# Patient Record
Sex: FEMALE | Race: NATIVE HAWAIIAN OR OTHER PACIFIC ISLANDER | Employment: OTHER | URBAN - METROPOLITAN AREA
[De-identification: names, ages, dates, MRNs, and addresses within clinical notes are randomized per-mention and may not be internally consistent; named-entity substitution may affect disease eponyms.]

---

## 2018-09-08 ENCOUNTER — APPOINTMENT (OUTPATIENT)
Dept: CT IMAGING | Age: 83
DRG: 917 | End: 2018-09-08
Payer: MEDICARE

## 2018-09-08 ENCOUNTER — APPOINTMENT (OUTPATIENT)
Dept: GENERAL RADIOLOGY | Age: 83
DRG: 917 | End: 2018-09-08
Payer: MEDICARE

## 2018-09-08 ENCOUNTER — HOSPITAL ENCOUNTER (INPATIENT)
Age: 83
LOS: 3 days | Discharge: PSYCHIATRIC OR SUBSTANCE USE FACILITY | DRG: 917 | End: 2018-09-12
Attending: EMERGENCY MEDICINE | Admitting: INTERNAL MEDICINE
Payer: MEDICARE

## 2018-09-08 DIAGNOSIS — R41.82 ALTERED MENTAL STATUS, UNSPECIFIED ALTERED MENTAL STATUS TYPE: Primary | ICD-10-CM

## 2018-09-08 DIAGNOSIS — T50.904A DRUG OVERDOSE, UNDETERMINED INTENT, INITIAL ENCOUNTER: ICD-10-CM

## 2018-09-08 LAB
BASE EXCESS VENOUS: 7 (ref -3–3)
HCO3 VENOUS: 31.1 MMOL/L (ref 23–29)
LACTATE: 1.32 MMOL/L (ref 0.4–2)
O2 SAT, VEN: 72 %
PCO2, VEN: 48.9 MM HG (ref 40–50)
PERFORMED ON: ABNORMAL
PH VENOUS: 7.41 (ref 7.35–7.45)
PO2, VEN: 38 MM HG
POC SAMPLE TYPE: ABNORMAL
TCO2 CALC VENOUS: 33 MMOL/L

## 2018-09-08 PROCEDURE — 93005 ELECTROCARDIOGRAM TRACING: CPT | Performed by: EMERGENCY MEDICINE

## 2018-09-08 PROCEDURE — 80076 HEPATIC FUNCTION PANEL: CPT

## 2018-09-08 PROCEDURE — 82550 ASSAY OF CK (CPK): CPT

## 2018-09-08 PROCEDURE — G0480 DRUG TEST DEF 1-7 CLASSES: HCPCS

## 2018-09-08 PROCEDURE — 80048 BASIC METABOLIC PNL TOTAL CA: CPT

## 2018-09-08 PROCEDURE — 71045 X-RAY EXAM CHEST 1 VIEW: CPT

## 2018-09-08 PROCEDURE — 82803 BLOOD GASES ANY COMBINATION: CPT

## 2018-09-08 PROCEDURE — 81001 URINALYSIS AUTO W/SCOPE: CPT

## 2018-09-08 PROCEDURE — 83605 ASSAY OF LACTIC ACID: CPT

## 2018-09-08 PROCEDURE — 99291 CRITICAL CARE FIRST HOUR: CPT

## 2018-09-08 PROCEDURE — 85025 COMPLETE CBC W/AUTO DIFF WBC: CPT

## 2018-09-08 PROCEDURE — 80307 DRUG TEST PRSMV CHEM ANLYZR: CPT

## 2018-09-09 ENCOUNTER — APPOINTMENT (OUTPATIENT)
Dept: CT IMAGING | Age: 83
DRG: 917 | End: 2018-09-09
Payer: MEDICARE

## 2018-09-09 PROBLEM — R41.82 ALTERED MENTAL STATE: Status: ACTIVE | Noted: 2018-09-09

## 2018-09-09 LAB
ACETAMINOPHEN LEVEL: <5 UG/ML (ref 10–30)
ALBUMIN SERPL-MCNC: 4.6 G/DL (ref 3.4–5)
ALP BLD-CCNC: 68 U/L (ref 40–129)
ALT SERPL-CCNC: 24 U/L (ref 10–40)
AMPHETAMINE SCREEN, URINE: NORMAL
ANION GAP SERPL CALCULATED.3IONS-SCNC: 13 MMOL/L (ref 3–16)
AST SERPL-CCNC: 28 U/L (ref 15–37)
BACTERIA: ABNORMAL /HPF
BARBITURATE SCREEN URINE: NORMAL
BASOPHILS ABSOLUTE: 0.1 K/UL (ref 0–0.2)
BASOPHILS RELATIVE PERCENT: 0.3 %
BENZODIAZEPINE SCREEN, URINE: NORMAL
BILIRUB SERPL-MCNC: 0.9 MG/DL (ref 0–1)
BILIRUBIN DIRECT: <0.2 MG/DL (ref 0–0.3)
BILIRUBIN URINE: NEGATIVE
BILIRUBIN, INDIRECT: NORMAL MG/DL (ref 0–1)
BLOOD, URINE: ABNORMAL
BUN BLDV-MCNC: 13 MG/DL (ref 7–20)
CALCIUM SERPL-MCNC: 9.7 MG/DL (ref 8.3–10.6)
CANNABINOID SCREEN URINE: NORMAL
CHLORIDE BLD-SCNC: 94 MMOL/L (ref 99–110)
CLARITY: CLEAR
CO2: 27 MMOL/L (ref 21–32)
COCAINE METABOLITE SCREEN URINE: NORMAL
COLOR: YELLOW
CREAT SERPL-MCNC: <0.5 MG/DL (ref 0.6–1.2)
EOSINOPHILS ABSOLUTE: 0.1 K/UL (ref 0–0.6)
EOSINOPHILS RELATIVE PERCENT: 0.3 %
EPITHELIAL CELLS, UA: ABNORMAL /HPF
ETHANOL: NORMAL MG/DL (ref 0–0.08)
GFR AFRICAN AMERICAN: >60
GFR NON-AFRICAN AMERICAN: >60
GLUCOSE BLD-MCNC: 148 MG/DL (ref 70–99)
GLUCOSE URINE: 100 MG/DL
HCT VFR BLD CALC: 45.6 % (ref 36–48)
HEMOGLOBIN: 14.9 G/DL (ref 12–16)
KETONES, URINE: 15 MG/DL
LEUKOCYTE ESTERASE, URINE: ABNORMAL
LYMPHOCYTES ABSOLUTE: 1.6 K/UL (ref 1–5.1)
LYMPHOCYTES RELATIVE PERCENT: 9 %
Lab: NORMAL
MCH RBC QN AUTO: 30 PG (ref 26–34)
MCHC RBC AUTO-ENTMCNC: 32.6 G/DL (ref 31–36)
MCV RBC AUTO: 92 FL (ref 80–100)
METHADONE SCREEN, URINE: NORMAL
MICROSCOPIC EXAMINATION: YES
MONOCYTES ABSOLUTE: 1.1 K/UL (ref 0–1.3)
MONOCYTES RELATIVE PERCENT: 6.3 %
NEUTROPHILS ABSOLUTE: 15.1 K/UL (ref 1.7–7.7)
NEUTROPHILS RELATIVE PERCENT: 84.1 %
NITRITE, URINE: NEGATIVE
OPIATE SCREEN URINE: NORMAL
OXYCODONE URINE: NORMAL
PDW BLD-RTO: 14.3 % (ref 12.4–15.4)
PH UA: 7.5
PH UA: 7.5
PHENCYCLIDINE SCREEN URINE: NORMAL
PLATELET # BLD: 337 K/UL (ref 135–450)
PMV BLD AUTO: 8.1 FL (ref 5–10.5)
POTASSIUM SERPL-SCNC: 3.5 MMOL/L (ref 3.5–5.1)
PROPOXYPHENE SCREEN: NORMAL
PROTEIN UA: ABNORMAL MG/DL
RBC # BLD: 4.96 M/UL (ref 4–5.2)
RBC UA: ABNORMAL /HPF (ref 0–2)
SALICYLATE, SERUM: <0.3 MG/DL (ref 15–30)
SODIUM BLD-SCNC: 134 MMOL/L (ref 136–145)
SPECIFIC GRAVITY UA: 1.02
TOTAL CK: 149 U/L (ref 26–192)
TOTAL PROTEIN: 7.8 G/DL (ref 6.4–8.2)
URINE TYPE: ABNORMAL
UROBILINOGEN, URINE: 0.2 E.U./DL
WBC # BLD: 18 K/UL (ref 4–11)
WBC UA: ABNORMAL /HPF (ref 0–5)

## 2018-09-09 PROCEDURE — 6360000002 HC RX W HCPCS: Performed by: INTERNAL MEDICINE

## 2018-09-09 PROCEDURE — 1200000000 HC SEMI PRIVATE

## 2018-09-09 PROCEDURE — 2580000003 HC RX 258: Performed by: INTERNAL MEDICINE

## 2018-09-09 PROCEDURE — 72125 CT NECK SPINE W/O DYE: CPT

## 2018-09-09 PROCEDURE — 2700000000 HC OXYGEN THERAPY PER DAY

## 2018-09-09 PROCEDURE — 6370000000 HC RX 637 (ALT 250 FOR IP): Performed by: INTERNAL MEDICINE

## 2018-09-09 PROCEDURE — 70450 CT HEAD/BRAIN W/O DYE: CPT

## 2018-09-09 PROCEDURE — 94761 N-INVAS EAR/PLS OXIMETRY MLT: CPT

## 2018-09-09 RX ORDER — SODIUM CHLORIDE 0.9 % (FLUSH) 0.9 %
10 SYRINGE (ML) INJECTION EVERY 12 HOURS SCHEDULED
Status: DISCONTINUED | OUTPATIENT
Start: 2018-09-09 | End: 2018-09-12 | Stop reason: HOSPADM

## 2018-09-09 RX ORDER — POTASSIUM CHLORIDE 7.45 MG/ML
10 INJECTION INTRAVENOUS PRN
Status: DISCONTINUED | OUTPATIENT
Start: 2018-09-09 | End: 2018-09-09 | Stop reason: SDUPTHER

## 2018-09-09 RX ORDER — OMEPRAZOLE 20 MG/1
20 CAPSULE, DELAYED RELEASE ORAL DAILY
Status: ON HOLD | COMMUNITY
End: 2020-05-24

## 2018-09-09 RX ORDER — LATANOPROST 50 UG/ML
1 SOLUTION/ DROPS OPHTHALMIC NIGHTLY
Status: DISCONTINUED | OUTPATIENT
Start: 2018-09-09 | End: 2018-09-12 | Stop reason: HOSPADM

## 2018-09-09 RX ORDER — TIMOLOL MALEATE 2.5 MG/ML
1 SOLUTION OPHTHALMIC DAILY
Status: DISCONTINUED | OUTPATIENT
Start: 2018-09-09 | End: 2018-09-12 | Stop reason: HOSPADM

## 2018-09-09 RX ORDER — POTASSIUM CHLORIDE 7.45 MG/ML
10 INJECTION INTRAVENOUS PRN
Status: DISCONTINUED | OUTPATIENT
Start: 2018-09-09 | End: 2018-09-12 | Stop reason: HOSPADM

## 2018-09-09 RX ORDER — THIAMINE HYDROCHLORIDE 100 MG/ML
500 INJECTION, SOLUTION INTRAMUSCULAR; INTRAVENOUS ONCE
Status: DISCONTINUED | OUTPATIENT
Start: 2018-09-09 | End: 2018-09-09 | Stop reason: CLARIF

## 2018-09-09 RX ORDER — LORAZEPAM 0.5 MG/1
0.5 TABLET ORAL EVERY 6 HOURS PRN
Status: ON HOLD | COMMUNITY
End: 2020-05-24

## 2018-09-09 RX ORDER — SODIUM CHLORIDE 9 MG/ML
INJECTION, SOLUTION INTRAVENOUS CONTINUOUS
Status: ACTIVE | OUTPATIENT
Start: 2018-09-09 | End: 2018-09-10

## 2018-09-09 RX ORDER — THIAMINE HYDROCHLORIDE 100 MG/ML
100 INJECTION, SOLUTION INTRAMUSCULAR; INTRAVENOUS DAILY
Status: DISCONTINUED | OUTPATIENT
Start: 2018-09-10 | End: 2018-09-09 | Stop reason: CLARIF

## 2018-09-09 RX ORDER — CALCIUM CARBONATE 500(1250)
500 TABLET ORAL DAILY
Status: ON HOLD | COMMUNITY
End: 2020-05-24

## 2018-09-09 RX ORDER — LOPERAMIDE HYDROCHLORIDE 2 MG/1
2 CAPSULE ORAL 4 TIMES DAILY PRN
Status: ON HOLD | COMMUNITY
End: 2020-05-24

## 2018-09-09 RX ORDER — AMLODIPINE BESYLATE 2.5 MG/1
2.5 TABLET ORAL DAILY
Status: ON HOLD | COMMUNITY
End: 2020-05-29 | Stop reason: SDUPTHER

## 2018-09-09 RX ORDER — POTASSIUM CHLORIDE 20 MEQ/1
40 TABLET, EXTENDED RELEASE ORAL PRN
Status: DISCONTINUED | OUTPATIENT
Start: 2018-09-09 | End: 2018-09-12 | Stop reason: HOSPADM

## 2018-09-09 RX ORDER — SODIUM CHLORIDE 0.9 % (FLUSH) 0.9 %
10 SYRINGE (ML) INJECTION PRN
Status: DISCONTINUED | OUTPATIENT
Start: 2018-09-09 | End: 2018-09-12 | Stop reason: HOSPADM

## 2018-09-09 RX ORDER — LATANOPROST 50 UG/ML
1 SOLUTION/ DROPS OPHTHALMIC NIGHTLY
COMMUNITY

## 2018-09-09 RX ORDER — SACCHAROMYCES BOULARDII 250 MG
250 CAPSULE ORAL 2 TIMES DAILY
Status: ON HOLD | COMMUNITY
End: 2020-05-24

## 2018-09-09 RX ORDER — ONDANSETRON 2 MG/ML
4 INJECTION INTRAMUSCULAR; INTRAVENOUS EVERY 6 HOURS PRN
Status: DISCONTINUED | OUTPATIENT
Start: 2018-09-09 | End: 2018-09-12 | Stop reason: HOSPADM

## 2018-09-09 RX ADMIN — SODIUM CHLORIDE: 9 INJECTION, SOLUTION INTRAVENOUS at 03:52

## 2018-09-09 RX ADMIN — LATANOPROST 1 DROP: 50 SOLUTION OPHTHALMIC at 21:15

## 2018-09-09 RX ADMIN — THIAMINE HYDROCHLORIDE 500 MG: 100 INJECTION, SOLUTION INTRAMUSCULAR; INTRAVENOUS at 13:25

## 2018-09-09 RX ADMIN — ENOXAPARIN SODIUM 40 MG: 40 INJECTION SUBCUTANEOUS at 12:52

## 2018-09-09 RX ADMIN — SODIUM CHLORIDE: 9 INJECTION, SOLUTION INTRAVENOUS at 17:47

## 2018-09-09 NOTE — FLOWSHEET NOTE
09/09/18 1258   Encounter Summary   Services provided to: Patient   Referral/Consult From: Rounding   Continue Visiting (es 9/9)   Volunteer Visit (alter peerless 9/9)   Complexity of Encounter Moderate   Length of Encounter 15 minutes   Routine   Type Initial   Assessment Approachable; Sad   Intervention Active listening;Scripture;Provided reading materials/devotional materials; Discussed illness/injury and it's impact  ([facility] new year)   Outcome Receptive;Engaged in conversation

## 2018-09-09 NOTE — PROGRESS NOTES
4 Eyes Admission Assessment     I agree as the admission nurse that 2 RN's have performed a thorough Head to Toe Skin Assessment on the patient. ALL assessment sites listed below have been assessed on admission. Areas assessed by both nurses:   [x]   Head, Face, and Ears   [x]   Shoulders, Back, and Chest  [x]   Arms, Elbows, and Hands   [x]   Coccyx, Sacrum, and Ischum  [x]   Legs, Feet, and Heels        Does the Patient have Skin Breakdown?   Yes a wound was noted on the Admission Assessment and an LDA was Initiated documentation include the Clarisa-wound, Wound Assessment, Measurements, Dressing Treatment, Drainage, and Color\",         Ty Prevention initiated:  Yes   Wound Care Orders initiated:  Yes      [id number] nurse consulted for Pressure Injury (Stage 3,4, Unstageable, DTI, NWPT, and Complex wounds):  No      Nurse 1 eSignature: Electronically signed by Dominic Kaplan RN on 9/9/18 at 3:33 AM        **SHARE this note so that the co-signing nurse is able to place an eSignature**    Nurse 2 eSignature: Electronically signed by Karthik Menjivar RN on 9/9/18 at 4:19 AM

## 2018-09-09 NOTE — ED NOTES
Report to [facility] on [address], transport to floor with RN and tele      [facility]  09/09/18 6857

## 2018-09-09 NOTE — PROGRESS NOTES
Patient is more responsive at this time. Patient is staying awake more this afternoon. Patient is alert and oriented x 3. Patient denies any pain or nausea at this time. Patient started on general diet per doctor orders. Patient still very weak at this time. Patient unable to bear her weight at this time. Patient using a bed pan to go to the restroom. Patient resting in bed with the bed alarm in place. Sitter at the bedside per doctor orders for suicide precautions. Will continue to monitor the patient.

## 2018-09-09 NOTE — ED PROVIDER NOTES
1.005 - 1.030    Blood, Urine LARGE (A) Negative    pH, UA 7.5 5.0 - 8.0    Protein, UA TRACE (A) Negative mg/dL    Urobilinogen, Urine 0.2 <2.0 E.U./dL    Nitrite, Urine Negative Negative    Leukocyte Esterase, Urine SMALL (A) Negative    Microscopic Examination YES     Urine Type Not Specified    Microscopic Urinalysis   Result Value Ref Range    WBC, UA 3-5 0 - 5 /HPF    RBC, UA 20-50 (A) 0 - 2 /HPF    Epi Cells 0-2 /HPF    Bacteria, UA 3+ (A) /HPF   POCT Venous   Result Value Ref Range    pH, Aruna 7.412 7.350 - 7.450    pCO2, Aruna 48.9 40.0 - 50.0 mm Hg    pO2, Aruna 38 Not Established mm Hg    HCO3, Venous 31.1 (H) 23.0 - 29.0 mmol/L    Base Excess, Aruna 7 (H) -3 - 3    O2 Sat, Aruna 72 Not Established %    TC02 (Calc), Aruna 33 Not Established mmol/L    Lactate 1.32 0.40 - 2.00 mmol/L    Sample Type ARUNA     Performed on SEE BELOW        ED BEDSIDE ULTRASOUND:    RECENT VITALS:  BP: 133/86, Temp: 97.9 °F (36.6 °C), Pulse: 81, Resp: 22, SpO2: 100 %     Procedures       ED Course     Nursing Notes, Past Medical Hx, Past Surgical Hx, Social Hx, Allergies, and Family Hx were reviewed. The patient was given the following medications:  No orders of the defined types were placed in this encounter. CONSULTS:  Kyung Grant / ANCA / Wilson Beto is a 80 y.o. female with history and presentation described above. This patient was also evaluated by the attending physician. All management and disposition plans were discussed and agreed upon. A thorough chart review was performed during the management of this patient. The patient had a nonfocal neuro exam but was drowsy. She did appear intoxicated. There are multiple medications found on scene as well as a note that read \"DNR\". For this reason I'm concerned that this was a possible suicide attempt. However, given her mental status is unable to ask her about this.   Other etiologies of altered mental status this age group is broad. Her EKG did not reveal any obvious abnormalities. We did get a head CT that showed some old strokes but nothing acute. There is also no bleed that would account for her altered mental status. Chest x-ray was negative for any traumatic pathology. Interestingly, her alcohol, salicylate, and Tylenol levels were all negative. Her urine drug screen was also negative. She had normal renal function and electrolytes. LFTs within normal limits. She had a leukocytosis. Her urine did not show any evidence of infection. There was some blood but she also was straight cathed which could be the cause of this. Her blood gas was also negative. In talking to poison control, they suspect that the benzodiazepines and Ambien are likely cause of her altered mental status. They did mention that Ativan often does not come up positive on urine drug screens and so the negative benzodiazepine on the screen does not rule this out. Her presentation is certainly consistent with an overdose. I have very low suspicion for any other potential cause of her altered mental status. She does not appear to have any sort of infection. Her laboratory workup is negative. Her head CT was also negative. We will admit the patient to the hospital for further observation to allow her to metabolize and for further workup of her altered mental status. She will also need to be seen by psychiatry while she is inpatient as this is a potential intentional overdose. Clinical Impression     1. Altered mental status, unspecified altered mental status type    2. Drug overdose, undetermined intent, initial encounter        Disposition     PATIENT REFERRED TO:  No follow-up provider specified.     DISCHARGE MEDICATIONS:  New Prescriptions    No medications on file       DISPOSITION Decision To Admit 09/09/2018 01:13:07 AM      Karla Acosta MD, PGY- 4   Emergency Medicine       Karla Acosta, MD  Resident  09/09/18 6399

## 2018-09-09 NOTE — PROGRESS NOTES
Checked after early AM admission    Still confused    Sitter in the room    Drug screen is negative.  Tylenol and ASA levels are undetectable    Psychiatry consulted    I started IV thiamine    Electronically signed by Baljinder Sanford MD on 9/9/2018 at 11:42 AM

## 2018-09-09 NOTE — ED NOTES
OK for pt to go to Med/Surg/Tele floor - does not need PCU per Dr. Rg Alves.      Yonathan Murillo RN  09/09/18 0372

## 2018-09-09 NOTE — PROGRESS NOTES
Pt admitted to room 5309 from ED. Pt had BM and urine in depends. Pt cleaned and new depends put on pt. Skin assessment and admission assessment complete. Will continue to monitor.

## 2018-09-10 LAB
ANION GAP SERPL CALCULATED.3IONS-SCNC: 11 MMOL/L (ref 3–16)
BASOPHILS ABSOLUTE: 0 K/UL (ref 0–0.2)
BASOPHILS RELATIVE PERCENT: 0.5 %
BUN BLDV-MCNC: 11 MG/DL (ref 7–20)
CALCIUM SERPL-MCNC: 9.3 MG/DL (ref 8.3–10.6)
CHLORIDE BLD-SCNC: 101 MMOL/L (ref 99–110)
CO2: 26 MMOL/L (ref 21–32)
CREAT SERPL-MCNC: <0.5 MG/DL (ref 0.6–1.2)
EOSINOPHILS ABSOLUTE: 0.1 K/UL (ref 0–0.6)
EOSINOPHILS RELATIVE PERCENT: 1.3 %
FOLATE: 12.6 NG/ML (ref 4.78–24.2)
GFR AFRICAN AMERICAN: >60
GFR NON-AFRICAN AMERICAN: >60
GLUCOSE BLD-MCNC: 108 MG/DL (ref 70–99)
HCT VFR BLD CALC: 41.6 % (ref 36–48)
HEMOGLOBIN: 13.9 G/DL (ref 12–16)
LYMPHOCYTES ABSOLUTE: 2.1 K/UL (ref 1–5.1)
LYMPHOCYTES RELATIVE PERCENT: 20.9 %
MCH RBC QN AUTO: 30.6 PG (ref 26–34)
MCHC RBC AUTO-ENTMCNC: 33.5 G/DL (ref 31–36)
MCV RBC AUTO: 91.2 FL (ref 80–100)
MONOCYTES ABSOLUTE: 1 K/UL (ref 0–1.3)
MONOCYTES RELATIVE PERCENT: 9.4 %
NEUTROPHILS ABSOLUTE: 6.9 K/UL (ref 1.7–7.7)
NEUTROPHILS RELATIVE PERCENT: 67.9 %
PDW BLD-RTO: 14.3 % (ref 12.4–15.4)
PLATELET # BLD: 315 K/UL (ref 135–450)
PMV BLD AUTO: 7.6 FL (ref 5–10.5)
POTASSIUM REFLEX MAGNESIUM: 3.7 MMOL/L (ref 3.5–5.1)
RBC # BLD: 4.56 M/UL (ref 4–5.2)
SODIUM BLD-SCNC: 138 MMOL/L (ref 136–145)
TSH REFLEX: 1.98 UIU/ML (ref 0.27–4.2)
VITAMIN B-12: 536 PG/ML (ref 211–911)
WBC # BLD: 10.2 K/UL (ref 4–11)

## 2018-09-10 PROCEDURE — 6360000002 HC RX W HCPCS: Performed by: INTERNAL MEDICINE

## 2018-09-10 PROCEDURE — 82607 VITAMIN B-12: CPT

## 2018-09-10 PROCEDURE — G8987 SELF CARE CURRENT STATUS: HCPCS

## 2018-09-10 PROCEDURE — 97161 PT EVAL LOW COMPLEX 20 MIN: CPT

## 2018-09-10 PROCEDURE — 6370000000 HC RX 637 (ALT 250 FOR IP): Performed by: INTERNAL MEDICINE

## 2018-09-10 PROCEDURE — 82746 ASSAY OF FOLIC ACID SERUM: CPT

## 2018-09-10 PROCEDURE — G8979 MOBILITY GOAL STATUS: HCPCS

## 2018-09-10 PROCEDURE — 84443 ASSAY THYROID STIM HORMONE: CPT

## 2018-09-10 PROCEDURE — G8978 MOBILITY CURRENT STATUS: HCPCS

## 2018-09-10 PROCEDURE — 97166 OT EVAL MOD COMPLEX 45 MIN: CPT

## 2018-09-10 PROCEDURE — 2580000003 HC RX 258: Performed by: INTERNAL MEDICINE

## 2018-09-10 PROCEDURE — 80048 BASIC METABOLIC PNL TOTAL CA: CPT

## 2018-09-10 PROCEDURE — 97530 THERAPEUTIC ACTIVITIES: CPT

## 2018-09-10 PROCEDURE — 1200000000 HC SEMI PRIVATE

## 2018-09-10 PROCEDURE — 99221 1ST HOSP IP/OBS SF/LOW 40: CPT | Performed by: PSYCHIATRY & NEUROLOGY

## 2018-09-10 PROCEDURE — G8988 SELF CARE GOAL STATUS: HCPCS

## 2018-09-10 PROCEDURE — 85025 COMPLETE CBC W/AUTO DIFF WBC: CPT

## 2018-09-10 PROCEDURE — 97535 SELF CARE MNGMENT TRAINING: CPT

## 2018-09-10 PROCEDURE — 36415 COLL VENOUS BLD VENIPUNCTURE: CPT

## 2018-09-10 RX ORDER — LORAZEPAM 1 MG/1
3 TABLET ORAL
Status: DISCONTINUED | OUTPATIENT
Start: 2018-09-10 | End: 2018-09-12 | Stop reason: HOSPADM

## 2018-09-10 RX ORDER — LORAZEPAM 2 MG/ML
4 INJECTION INTRAMUSCULAR
Status: DISCONTINUED | OUTPATIENT
Start: 2018-09-10 | End: 2018-09-12 | Stop reason: HOSPADM

## 2018-09-10 RX ORDER — SODIUM CHLORIDE 0.9 % (FLUSH) 0.9 %
10 SYRINGE (ML) INJECTION EVERY 12 HOURS SCHEDULED
Status: DISCONTINUED | OUTPATIENT
Start: 2018-09-10 | End: 2018-09-12 | Stop reason: HOSPADM

## 2018-09-10 RX ORDER — LORAZEPAM 1 MG/1
4 TABLET ORAL
Status: DISCONTINUED | OUTPATIENT
Start: 2018-09-10 | End: 2018-09-12 | Stop reason: HOSPADM

## 2018-09-10 RX ORDER — SODIUM CHLORIDE 0.9 % (FLUSH) 0.9 %
10 SYRINGE (ML) INJECTION PRN
Status: DISCONTINUED | OUTPATIENT
Start: 2018-09-10 | End: 2018-09-12 | Stop reason: HOSPADM

## 2018-09-10 RX ORDER — LORAZEPAM 2 MG/ML
2 INJECTION INTRAMUSCULAR
Status: DISCONTINUED | OUTPATIENT
Start: 2018-09-10 | End: 2018-09-12 | Stop reason: HOSPADM

## 2018-09-10 RX ORDER — LORAZEPAM 0.5 MG/1
1 TABLET ORAL
Status: DISCONTINUED | OUTPATIENT
Start: 2018-09-10 | End: 2018-09-12 | Stop reason: HOSPADM

## 2018-09-10 RX ORDER — LORAZEPAM 0.5 MG/1
2 TABLET ORAL
Status: DISCONTINUED | OUTPATIENT
Start: 2018-09-10 | End: 2018-09-12 | Stop reason: HOSPADM

## 2018-09-10 RX ORDER — LORAZEPAM 2 MG/ML
3 INJECTION INTRAMUSCULAR
Status: DISCONTINUED | OUTPATIENT
Start: 2018-09-10 | End: 2018-09-12 | Stop reason: HOSPADM

## 2018-09-10 RX ORDER — LORAZEPAM 2 MG/ML
1 INJECTION INTRAMUSCULAR
Status: DISCONTINUED | OUTPATIENT
Start: 2018-09-10 | End: 2018-09-12 | Stop reason: HOSPADM

## 2018-09-10 RX ADMIN — THIAMINE HYDROCHLORIDE 500 MG: 100 INJECTION, SOLUTION INTRAMUSCULAR; INTRAVENOUS at 14:12

## 2018-09-10 RX ADMIN — Medication 10 ML: at 20:59

## 2018-09-10 RX ADMIN — THIAMINE HYDROCHLORIDE 100 MG: 100 INJECTION, SOLUTION INTRAMUSCULAR; INTRAVENOUS at 09:35

## 2018-09-10 RX ADMIN — Medication 10 ML: at 09:43

## 2018-09-10 RX ADMIN — LATANOPROST 1 DROP: 50 SOLUTION OPHTHALMIC at 20:58

## 2018-09-10 RX ADMIN — ENOXAPARIN SODIUM 40 MG: 40 INJECTION SUBCUTANEOUS at 09:35

## 2018-09-10 RX ADMIN — TIMOLOL MALEATE 1 DROP: 2.5 SOLUTION, GEL FORMING, EXTENDED RELEASE OPHTHALMIC at 09:35

## 2018-09-10 NOTE — PROGRESS NOTES
overdose  -Pt still suicidal. States that she feels to weak to take care of her ADL. Does uses walker but not happy with her quality of life. Agreeable to work with PT/OT. -Cont neuro checks, reorientation and suicide precautions with sitter at bedside   -Psych recommending inpatient psych after stabilization    Alcohol Use:  -Drinks 1/drink per day  -Cont IV thiamine  -Placed on CIWA protocol    DVT Prophylaxis: lovenox  Diet: DIET GENERAL;  Code Status: DNR-CC    PT/OT Eval Status: Evaluating    Dispo: TBD. Discussed inpatient psychiatry with the patient and niece. Both not agreeable, want to consider rehab facility after discussion with Son tomorrow. Explained it would not be possible given patients ongoing suicidal ideation but they would like to wait for the Son tomorrow.     Jose Leon  Hospitalist  Attending Physician

## 2018-09-10 NOTE — PLAN OF CARE
Problem: Falls - Risk of:  Goal: Will remain free from falls  Will remain free from falls   Outcome: Ongoing  Pt a high fall risk. Bed locked in lowest position, call light/table in reach, sitter for safety. Will continue to monitor. Problem: Risk for Impaired Skin Integrity  Goal: Tissue integrity - skin and mucous membranes  Structural intactness and normal physiological function of skin and  mucous membranes. Outcome: Ongoing  Skin assessed this shift. Skin is very fragile and pt has scattered bruising. Mepilex placed on a skin tear on pt leg. Pt moves herself very easily and frequently in bed. Bottom and coccyx has some blanchable redness. Will continue to monitor.

## 2018-09-10 NOTE — PLAN OF CARE
Problem: Musculor/Skeletal Functional Status  Intervention: PT Evaluation/treatment  Increase functional status to baseline

## 2018-09-10 NOTE — PROGRESS NOTES
Physical Therapy    Facility/Department: [facility] SURGERY  Initial Assessment/treatement note      NAME: Christy Owens  : 1924  MRN: [id number]    Date of Service: 9/10/2018    Discharge Recommendations:Kelly Talavera scored a  on the AM-PAC short mobility form. Current research shows that an AM-PAC score of 17 or less is typically not associated with a discharge to the patient's home setting. Based on the patients AM-PAC score and their current functional mobility deficits, it is recommended that the patient have 3-5 sessions per week of Physical Therapy at d/c to increase the patients independence. PT Equipment Recommendations  Equipment Needed:  (defer)    Patient Diagnosis(es): The primary encounter diagnosis was Altered mental status, unspecified altered mental status type. A diagnosis of Drug overdose, undetermined intent, initial encounter was also pertinent to this visit. has a past medical history of Anxiety; Benign neoplasm of colon; Benign thyroid cyst; Bone/cartilage disorder; Chronic diarrhea; Depression; Enterocolitis; Esophagitis; GERD (gastroesophageal reflux disease); H/O Clostridium difficile infection; [place] (hard of hearing); Hyperlipidemia; Hypertension; Insomnia; Osteoporosis; Pinched nerve in neck; Rotator cuff tendinitis; Sciatica; Skin cancer; Stenosis, spinal, lumbar; TIA (transient ischemic attack); and Vitamin D deficiency. has a past surgical history that includes Cataract removal and knee surgery. Restrictions  Position Activity Restriction  Other position/activity restrictions: up with assist, suicide precautions     Vision/Hearing  Vision: Impaired  Vision Exceptions: Wears glasses for reading  Hearing: Exceptions to [facility]  Hearing Exceptions: Bilateral hearing aid       Subjective  General  Chart Reviewed: Yes  Additional Pertinent Hx: 81 yo admitted 18 for AMS/suicide attempt. Psych consult-pt on hold for psych placement.  Pmhx: colon 80 percent impaired, limited or restricted                                       AM-PAC Score  AM-PAC Inpatient Mobility Raw Score : 9  AM-PAC Inpatient T-Scale Score : 30.55  Mobility Inpatient CMS 0-100% Score: 81.38  Mobility Inpatient CMS G-Code Modifier : CM          Goals  Short term goals  Time Frame for Short term goals: discharge  Short term goal 1: sit to/from supine CGA  Short term goal 2: sit to/from stand CGA  Short term goal 3: bed to/from chair CGA  Patient Goals   Patient goals : return home       Therapy Time   Individual Concurrent Group Co-treatment   Time In 0836         Time Out 0921         Minutes 45           Timed Code Treatment Minutes:35       Total Treatment Minutes:  100 Robin Hidalgo PT

## 2018-09-10 NOTE — CONSULTS
PSYCHIATRY CONSULT, INITIAL EVALUATION    Anita Molina [id number]     Date/time of admission: 9/8/2018 10:54 PM    CC/Reason for Consult: safety, suicide attempt    HPI: 80 y.o. female with h/o depression who is admitted to medicine s/p OD. Pt seen at bedside and admits that she took multiple medications last night as an overdose attempt to end her life. Unknown amount of medications but several empty bottles found next to her. Says she decided she wanted to do that yesterday and denies that she had been drinking at the time. Says the reason is her declining functioning and health problems. She remains suicidal and \"I wish it worked. \" She denies HI, h/o AVH, cleo. She endorses several months of depressed mood, anergia, amotivation, anhedonia, hope/helplessness. Denies change in appetite or sleep. She is amenable to a psychiatric admission.        context: medical hospitalization  severity: severe  location: AMS / mood disturbance  associated symptoms: depression, suicidal ideation  modifiers: stress, physical problems  duration: acute      Past Psychiatric History:    Prior hospitalizations: Denies   Prior diagnoses: Depression   Prior medication trials: Reviewed in EHR   Outpatient Treatment: PCP   Suicide Attempts: Denies      Substance Use History:   Nicotine:    Alcohol:1-2 drinks nightly   Illicits: Denies   Caffeine:    Past Medical History:   Past Medical History:   Diagnosis Date    Anxiety     Benign neoplasm of colon     Benign thyroid cyst     Bone/cartilage disorder     Chronic diarrhea     mild    Depression     Enterocolitis     d/t c-diff    Esophagitis     and esophageal ulcerations    GERD (gastroesophageal reflux disease)     H/O Clostridium difficile infection 06/2018    [place] (hard of hearing)     bilateral hearing aids     Hyperlipidemia     Hypertension     Insomnia     Osteoporosis     Pinched nerve in neck 08/2018    recent- causing patient to not to be able to hold fork, drive, play cards per niece     Rotator cuff tendinitis     Sciatica     Skin cancer     Stenosis, spinal, lumbar     chronic back pain    TIA (transient ischemic attack)     Vitamin D deficiency      Past Surgical History:   Procedure Laterality Date    CATARACT REMOVAL      right    KNEE SURGERY      bilateral knee replacements       Social/Developmental History:    Relationship:    Children: +, none in town   Housing: Assisted living   Occupation/Income: Retired   Education:   Legal hx:    Abuse hx:   Violence hx:   Access to firearms: No     Family History:   No family history on file. Medical:   Psychiatric: Denies   History of completed suicide: Denies    Allergies:  No Known Allergies    Home Medications:   No current facility-administered medications on file prior to encounter. No current outpatient prescriptions on file prior to encounter. Current Medications ordered:  Scheduled Meds:   latanoprost  1 drop Both Eyes Nightly    timolol  1 drop Right Eye Daily    sodium chloride flush  10 mL Intravenous 2 times per day    enoxaparin  40 mg Subcutaneous Daily    thiamine (VITAMIN B1) IVPB  500 mg Intravenous Q24H    thiamine (VITAMIN B1) IVPB  100 mg Intravenous Q24H     Continuous Infusions:  PRN Meds:.sodium chloride flush, magnesium hydroxide, ondansetron, potassium chloride **OR** potassium bicarb-citric acid **OR** potassium chloride    ROS: Denies trouble with fever, rash, headache, vision changes, chest pain, shortness of breath, nausea, extremity pain, weakness, dysuria. OBJECTIVE:  Vitals: Grace Vasquez Vitals:    09/09/18 1459 09/09/18 2009 09/09/18 2241 09/10/18 0330   BP: (!) 156/78 (!) 169/88 (!) 165/85 (!) 166/81   Pulse: 76 84 87 89   Resp: 16 16 18 16   Temp: 98.3 °F (36.8 °C) 98.3 °F (36.8 °C) 98.1 °F (36.7 °C) 98.8 °F (37.1 °C)   TempSrc: Oral Oral Oral Oral   SpO2: 95% 92% 94% 94%   Weight:       Height:         Body mass index is 20.8 kg/m².     Mental Status Exam:   Appearance: appears stated age, fair eye contact  Behavior/Movement/Muscle Tone: no PMR/PMA, no abnormal movements appreciated, anti-gravity  Gait/station: not tested  Speech:tone, prosody, volume, rate, production wnl, fluent nonpressured  Mood/Affect: dysphoric, depressed  Thought Process: goal directed, linear, no FOI, no YVONNE  Thought Content: +SI overdose, Denies HI, no delusions expressed, no AH/VH and not RTIS  Orientation: alert, oriented to person place and situation  Fund Of Knowledge: appears consistent with average intellect  Memory:grossly intact to recent and remote content discussed  Insight/Judgment: poor/poor    Labs:   Complete Blood Count:  Recent Labs      18   2337  09/10/18   0633   WBC  18.0*  10.2   HGB  14.9  13.9   HCT  45.6  41.6   MCV  92.0  91.2   PLT  337  315       Basic Metabolic Panel:  Recent Labs      187  09/10/18   0633   NA  134*  138   K  3.5  3.7   CL  94*  101   CO2  27  26   BUN  13  11       Hepatic Panel:  Recent Labs      18   2337   AST  28   ALT  24        Lab Results   Component Value Date    COLORU Yellow 2018    NITRU Negative 2018    GLUCOSEU 100 2018    KETUA 15 2018    UROBILINOGEN 0.2 2018    BILIRUBINUR Negative 2018         Imagin [facility] 18: \"    No acute territorial infarct, intracranial hemorrhage, or significant    mass effect.     Lacunar infarct in the right thalamus is favored to be subacute or    remote.  MRI of the brain may be considered for further characterization,    if prior imaging is unavailable for comparison.      \"    Axis I  Adjustment disorder with depressed mood and anxiety    Axis II: No diagnosis     Axis III       Diagnosis Date    Anxiety     Benign neoplasm of colon     Benign thyroid cyst     Bone/cartilage disorder     Chronic diarrhea     mild    Depression     Enterocolitis     d/t c-diff    Esophagitis     and esophageal ulcerations    GERD spend the night, minimize anticholingeric medications, minimize polypharmacy, minimize restraints (includes lines and/or foleys and/or feeding tubes as able) and minimize overnight checks/vitals to encourage restful consistent sleep patterns    4. Substance Abuse/Dep/Withdrawal: no active issues    5. Safety: does require admission to inpatient psychiatry once medically stable (no O2 requirement, no lines or drains, no IV medications, vitals stable for minimum of 24hrs, appropriate mobility established either by baseline or PT/OT evals, and outpatient medical followup plan in place)    Thank you for this consult, please call us with any further questions. I will continue to follow at this time.     Rosa Atkins   Psychiatrist

## 2018-09-10 NOTE — PROGRESS NOTES
Occupational Therapy   Occupational Therapy Initial Assessment/Treatment  Date: 9/10/2018   Patient Name: Meir Radford  MRN: [id number]     : 1924    Date of Service: 9/10/2018    Discharge Recommendations:    Meir Radford scored a 10/24 on the AM-PAC ADL Inpatient form. Current research shows that an AM-PAC score of 17 or less is typically not associated with a discharge to the patient's home setting. Based on the patients AM-PAC score and their current ADL deficits, it is recommended that the patient have 3-5 sessions per week of Occupational Therapy at d/c to increase the patients independence. OT Equipment Recommendations  Equipment Needed: No  Other: defer      Treatment Diagnosis: Impaired ADL, UE function, and functional mobility      Restrictions  Position Activity Restriction  Other position/activity restrictions: up with assist, suicide precautions    Subjective   General  Additional Pertinent Hx: Pt admitted 18 after being found down at home. Head CT= (-) acute,  Lacunar infarct in the right thalamus is favored to be subacute or remote    CT c-spine (-)       PMH includes: anxiety, chronic diarrhea, depression, GERD, esophagitis, c-diff, HTN, osteoporosis, pinched nerve in neck, rotator cuff tendinitis, sciatica, skin Ca, lumbar spinal stenosis, TIA   Family / Caregiver Present: No  Referring Practitioner: Dr. Luz Maria Dugan  Diagnosis: Altered Mental Status  Subjective  Subjective: Pt in bed upon entry. \"I did this to myself. \"  Pain Assessment  Patient Currently in Pain: Denies  Oxygen Therapy  SpO2: 95 %  O2 Device: None (Room air)  Social/Functional History  Social/Functional History  Lives With: Alone  Type of Home: Facility ([facility])  Home Layout: One level  Home Access: Level entry  Bathroom Shower/Tub: Walk-in shower  Bathroom Toilet: Handicap height  Bathroom Equipment: Grab bars around toilet, Built-in shower seat, Grab bars in shower, Hand-held shower  Home Equipment: 4 wheeled walker, Rolling walker, Alert Button  Receives Help From: Home health (2 hrs daily for ADL)  ADL Assistance: Needs assistance  Homemaking Assistance: Needs assistance (Goes to dining room for dinner)  Ambulation Assistance: Independent (via 4 wheeled walker)  Transfer Assistance: Independent  Active : No  Leisure & Hobbies: Play Bridge  Additional Comments: Pt reports having a few falls recently and that her hands have been working less requiried aide help in past few weeks.         Objective   Vision: Impaired  Vision Exceptions: Wears glasses for reading  Hearing: Exceptions to [facility]  Hearing Exceptions: Bilateral hearing aid    Orientation  Overall Orientation Status: Within Functional Limits     Balance  Sitting Balance: Minimal assistance (to CG)  Standing Balance: Maximum assistance  Standing Balance  Time: ~1 min  Activity: toileting, transfer  Sit to stand: Dependent/Total (max assist of 2)  Stand to sit: Dependent/Total (max assist of 2)  Toilet Transfers  Toilet - Technique: Stand pivot  Equipment Used: Standard bedside commode  Toilet Transfer: Dependent/Total (max assist of 2)  ADL  Feeding: Maximum assistance  Grooming: Maximum assistance  LE Dressing: Dependent/Total  Toileting: Maximum assistance  Tone RUE  RUE Tone: Normotonic  Tone LUE  LUE Tone: Normotonic  Coordination  Movements Are Fluid And Coordinated: No  Coordination and Movement description: Right UE;Left UE;Fine motor impairments;Gross motor impairments     Bed mobility  Supine to Sit: Contact guard assistance (HOB up and use of rail-very slow and effortful)  Scooting: Contact guard assistance  Transfers  Stand Pivot Transfers: Dependent/Total (max assist of 2)  Sit to stand: Dependent/Total (max assist of 2)  Stand to sit: Dependent/Total (max assist of 2)  Vision - Basic Assessment  Prior Vision: Wears glasses only for reading  Cognition  Overall Cognitive Status: [facility]        Sensation  Overall Sensation Status: Impaired (Pt c/o numbness and tinglingin hands and that her hands feel cold)        LUE PROM (degrees)  LUE PROM: WFL  LUE AROM (degrees)  LUE AROM : Exceptions  L Shoulder Flexion 0-180: ~85  RUE PROM (degrees)  RUE PROM: WFL  RUE AROM (degrees)  RUE AROM : Exceptions  R Shoulder Flexion 0-180: ~20  LUE Strength  Gross LUE Strength: Exceptions to [facility]  L Shoulder Flex: 2+/5  L Elbow Flex: 3-/5  L Elbow Ext: 3-/5  L Hand Grasp: 3-/5  RUE Strength  Gross RUE Strength: Exceptions to [facility]  R Shoulder Flex: 1+/5  R Elbow Flex: 3-/5  R Elbow Ext: 3-/5  R Hand Grasp: 3/5     Hand Dominance  Hand Dominance: Right     Treatment included ADL and transfer training. Assessment   Performance deficits / Impairments: Decreased functional mobility ; Decreased ADL status; Decreased ROM; Decreased strength;Decreased endurance  Assessment: Pt presents with poor functional independence. Pt is reportedly from independent living ([place]) and had assist with ADL, pt is currently requiring increased assist with all activities. Pt is not safe to return to [place]. Recommend use of [facility] for transfers. Treatment Diagnosis: Impaired ADL, UE function, and functional mobility  Prognosis: Fair  Decision Making: Medium Complexity  Patient Education: Role of OT:  Pt verbalized understanding  REQUIRES OT FOLLOW UP: Yes  Activity Tolerance  Activity Tolerance: Patient Tolerated treatment well  Safety Devices  Safety Devices in place: Yes  Type of devices: Left in chair;Call light within reach; Chair alarm in place;Nurse notified (sitter present)         Plan   Plan  Times per week: 2-5  Times per day: Daily  Current Treatment Recommendations: Functional Mobility Training, Endurance Training, Self-Care / ADL, ROM, Strengthening, Balance Training     If patient is discharged prior to next treatment, this note will serve as the discharge. G-Code  OT G-codes  Functional Limitation: Self care  Self Care Current Status ():  At

## 2018-09-11 PROCEDURE — 97116 GAIT TRAINING THERAPY: CPT

## 2018-09-11 PROCEDURE — 97530 THERAPEUTIC ACTIVITIES: CPT

## 2018-09-11 PROCEDURE — 6360000002 HC RX W HCPCS: Performed by: INTERNAL MEDICINE

## 2018-09-11 PROCEDURE — 2580000003 HC RX 258: Performed by: INTERNAL MEDICINE

## 2018-09-11 PROCEDURE — 97535 SELF CARE MNGMENT TRAINING: CPT

## 2018-09-11 PROCEDURE — 1200000000 HC SEMI PRIVATE

## 2018-09-11 RX ORDER — ASPIRIN 81 MG/1
81 TABLET ORAL DAILY
Status: DISCONTINUED | OUTPATIENT
Start: 2018-09-12 | End: 2018-09-12 | Stop reason: HOSPADM

## 2018-09-11 RX ORDER — AMLODIPINE BESYLATE 5 MG/1
5 TABLET ORAL DAILY
Status: DISCONTINUED | OUTPATIENT
Start: 2018-09-12 | End: 2018-09-12 | Stop reason: HOSPADM

## 2018-09-11 RX ADMIN — ENOXAPARIN SODIUM 40 MG: 40 INJECTION SUBCUTANEOUS at 08:39

## 2018-09-11 RX ADMIN — Medication 10 ML: at 20:15

## 2018-09-11 RX ADMIN — TIMOLOL MALEATE 1 DROP: 2.5 SOLUTION, GEL FORMING, EXTENDED RELEASE OPHTHALMIC at 10:42

## 2018-09-11 RX ADMIN — LATANOPROST 1 DROP: 50 SOLUTION OPHTHALMIC at 20:15

## 2018-09-11 RX ADMIN — Medication 10 ML: at 10:32

## 2018-09-11 RX ADMIN — THIAMINE HYDROCHLORIDE 100 MG: 100 INJECTION, SOLUTION INTRAMUSCULAR; INTRAVENOUS at 10:42

## 2018-09-11 RX ADMIN — Medication 10 ML: at 08:00

## 2018-09-11 ASSESSMENT — PAIN SCALES - GENERAL: PAINLEVEL_OUTOF10: 0

## 2018-09-11 NOTE — PROGRESS NOTES
Physical Therapy  Facility/Department: [facility] [address] [place] [address]  Daily Treatment Note    NAME: Lynne Jasso  : 1924  MRN: [id number]    Date of Service: 2018    Discharge Recommendations:Kelly Hart scored a 16/24 on the AM-PAC short mobility form. Current research shows that an AM-PAC score of 17 or less is typically not associated with a discharge to the patient's home setting. Based on the patients AM-PAC score and their current functional mobility deficits, it is recommended that the patient have 3-5 sessions per week of Physical Therapy at d/c to increase the patients independence. PT Equipment Recommendations  Equipment Needed:  (defer)    Patient Diagnosis(es): The primary encounter diagnosis was Altered mental status, unspecified altered mental status type. A diagnosis of Drug overdose, undetermined intent, initial encounter was also pertinent to this visit. has a past medical history of Anxiety; Benign neoplasm of colon; Benign thyroid cyst; Bone/cartilage disorder; Chronic diarrhea; Depression; Enterocolitis; Esophagitis; GERD (gastroesophageal reflux disease); H/O Clostridium difficile infection; [place] (hard of hearing); Hyperlipidemia; Hypertension; Insomnia; Osteoporosis; Pinched nerve in neck; Rotator cuff tendinitis; Sciatica; Skin cancer; Stenosis, spinal, lumbar; TIA (transient ischemic attack); and Vitamin D deficiency. has a past surgical history that includes Cataract removal and knee surgery. Restrictions  Position Activity Restriction  Other position/activity restrictions: up with assist, suicide precautions     Subjective   General  Additional Pertinent Hx: 81 yo admitted 18 for AMS/suicide attempt. Psych consult-pt on hold for psych placement. Pmhx: colon CA,chronic diarrhea, HTN, pinched nerve in neck, chronic back pain. Family / Caregiver Present: Yes (sitter in room)  Subjective  Subjective: Pt found supine in bed and agreeable to PT.    Pain Screening  Patient Currently in Pain: Denies         Orientation  Orientation  Overall Orientation Status: Within Functional Limits     Objective   Bed mobility  Supine to Sit: Stand by assistance (HOB up and use of rail)  Scooting: Stand by assistance     Transfers  Sit to Stand: Minimal Assistance (x3 trials with vc for hand placement)  Stand to sit: Minimal Assistance (x3 trials with vc for hand placement)     Ambulation  Device: Rolling Walker  Assistance: Minimal assistance  Quality of Gait: forward posture with quick, small steps; tactile assist to maneuver walker inconsistently; pt fatigues quickly  Distance: 10 ft x2, 15 ft        Exercises  Comments: Pt demo chair push up x5 with CGA and vc; one minute marching in sitting B LE. Assessment   Body structures, Functions, Activity limitations: Decreased functional mobility ; Decreased strength;Decreased endurance;Decreased balance;Decreased cognition  Assessment: Pt demo marked improvement with cognition and mobility this am and able to ambulate with walker plus assist of one. Pt still demo mobility below her stated baseline of independent living. Pt not safe to return to independent living at this t darren. Pt would benefit from continued skilled IP PT at discharge. Treatment Diagnosis: mobility impairment due to AMS/suicide attempt  Patient Education: Pt educated on PT role, need to call for assist to get up, importance of OOB  mobility  and she verbalized understanding.    REQUIRES PT FOLLOW UP: Yes  Activity Tolerance  Activity Tolerance: Patient Tolerated treatment well;Patient limited by endurance                     AM-PAC Score  AM-PAC Inpatient Mobility Raw Score : 16  AM-PAC Inpatient T-Scale Score : 40.78  Mobility Inpatient CMS 0-100% Score: 54.16  Mobility Inpatient CMS G-Code Modifier : CK          Goals  Short term goals  Time Frame for Short term goals: discharge  Short term goal 1: sit to/from supine CGA  Short term goal 2: sit to/from stand CGA  Short term goal 3: bed to/from chair CGA  Patient Goals   Patient goals : return home    Plan    Plan  Times per week: 2-5  Current Treatment Recommendations: Balance Training, Transfer Training, Functional Mobility Training, Strengthening, Gait Training, Endurance Training, Safety Education & Training  Safety Devices  Type of devices: Call light within reach, Chair alarm in place, Nurse notified, Sitter present, Left in chair     Therapy Time   Individual Concurrent Group Co-treatment   Time In 0920         Time Out 0950         Minutes 30           Timed Code Treatment Minutes:30       Total Treatment Minutes:30    This note will serve as a discharge summary if patient is discharged from hospital before next treatment session.          Paul Lopez, PT

## 2018-09-11 NOTE — PROGRESS NOTES
Occupational Therapy  Facility/Department: [facility] [address] [place] [address]  Daily Treatment Note  NAME: Rene Mcginnis  : 1924  MRN: [id number]    Date of Service: 2018    Discharge Recommendations:    Rene Mcginnis scored a 12/24 on the AM-PAC ADL Inpatient form. Current research shows that an AM-PAC score of 17 or less is typically not associated with a discharge to the patient's home setting. Based on the patients AM-PAC score and their current ADL deficits, it is recommended that the patient have 3-5 sessions per week of Occupational Therapy at d/c to increase the patients independence. OT Equipment Recommendations  Equipment Needed: No  Other: defer      Restrictions  Position Activity Restriction  Other position/activity restrictions: up with assist, suicide precautions  Subjective   General  Chart Reviewed: Yes  Additional Pertinent Hx: Pt admitted 18 after being found down at home. Head CT= (-) acute,  Lacunar infarct in the right thalamus is favored to be subacute or remote    CT c-spine (-)       PMH includes: anxiety, chronic diarrhea, depression, GERD, esophagitis, c-diff, HTN, osteoporosis, pinched nerve in neck, rotator cuff tendinitis, sciatica, skin Ca, lumbar spinal stenosis, TIA   Family / Caregiver Present: No  Referring Practitioner: Dr. Julia Laughlin  Diagnosis: Altered Mental Status  Subjective  Subjective: Pt in bed upon entry. Pain Assessment  Patient Currently in Pain: Denies  Vital Signs  Patient Currently in Pain: Denies   Orientation  Orientation  Overall Orientation Status: Within Functional Limits  Objective    ADL  Grooming: Maximum assistance  LE Dressing: Maximum assistance (to don brief and adjust socks)  Toileting:  Moderate assistance (req assist for clothing management and hygiene)        Standing Balance  Sit to stand: Minimal assistance  Stand to sit: Minimal assistance  Toilet Transfers  Toilet - Technique: Ambulating (via wheeled walker)  Equipment Used: Standard toilet (grab bar)  Toilet Transfer: Minimal assistance  Bed mobility  Supine to Sit: Stand by assistance (HOB up and use of rail)  Scooting: Stand by assistance  Transfers  Stand Step Transfers: Minimal assistance (via wheeled walker)  Sit to stand: Minimal assistance  Stand to sit: Minimal assistance                       Cognition  Overall Cognitive Status: WFL                    Type of ROM/Therapeutic Exercise  Type of ROM/Therapeutic Exercise: AROM  Exercises  Shoulder Flexion: x10 with clasped hands  Elbow Flexion/Extension: x10  Finger Flexion/Extension: x10                    Assessment   Performance deficits / Impairments: Decreased functional mobility ; Decreased ADL status; Decreased ROM; Decreased strength;Decreased endurance  Assessment: Pt demonstrates much improvement with functional mobility. Pt continues to req assist with ADL and functional mobility and is not safe to return home alone. Treatment Diagnosis: Impaired ADL, UE function, and functional mobility  Prognosis: Fair  Patient Education: Role of OT:  Pt verbalized understanding  REQUIRES OT FOLLOW UP: Yes  Activity Tolerance  Activity Tolerance: Patient Tolerated treatment well  Safety Devices  Safety Devices in place: Yes  Type of devices: Left in chair;Chair alarm in place;Call light within reach;Nurse notified          Plan   Plan  Times per week: 2-5  Times per day: Daily  Current Treatment Recommendations: Functional Mobility Training, Endurance Training, Self-Care / ADL, ROM, Strengthening, Balance Training     If patient is discharged prior to next treatment, this note will serve as the discharge.     AM-PAC Score        AM-[facility] Inpatient Daily Activity Raw Score: 12  AM-PAC Inpatient ADL T-Scale Score : 30.6  ADL Inpatient CMS 0-100% Score: 66.57  ADL Inpatient CMS G-Code Modifier : CL    Goals  Short term goals  Time Frame for Short term goals: Discharge  Short term goal 1: Transfer to/from [facility] with mod assist of 2 -MET 9/11/18-  Short term goal 2: Stance at walker with Mod assist x2 min to assist with ADL  Short term goal 3: Perform simple grooming with min assist  New goal:  Short term goal 4: Transfer to/from 3 in 1 commode with SBA  Patient Goals   Patient goals : Go home.   Be more independent       Therapy Time   Individual Concurrent Group Co-treatment   Time In 0922         Time Out 0946         Minutes 24         Timed Code Treatment Minutes: 24 Minutes    Total Treatment [address] [facility], OTR/L [address]

## 2018-09-11 NOTE — PROGRESS NOTES
Dr Vanessa Martines, patient's PCP, came by to check on her, and requested a palliative consult from Dr Roberto Kelly. Sent request per [employer].

## 2018-09-11 NOTE — PROGRESS NOTES
Patient awake, alert and oriented. Patient mood pleasant, interactive. Saline lock RAC. No complaints. Thiamine infused as ordered.  with patient monitoring. Will monitor.

## 2018-09-11 NOTE — PROGRESS NOTES
Hospitalist Progress Note      PCP: Karen Byrnes    Date of Admission: 9/8/2018    Chief Complaint: [address] Course:   Was admitted for intentional drug overdose. Mental status improving. Denies suicidal ideation. Wants to go back to assisted living. Discussed with patients son, niece and PCP. Will try to ask Dr. Jazmine Philip for his recommendation. Possible discharge tomorrow. Subjective:   Denies nausea, vomiting, chest pain, shortness of breath or abd pain    Medications:  Reviewed    Infusion Medications   Scheduled Medications    sodium chloride flush  10 mL Intravenous 2 times per day    latanoprost  1 drop Both Eyes Nightly    timolol  1 drop Right Eye Daily    sodium chloride flush  10 mL Intravenous 2 times per day    enoxaparin  40 mg Subcutaneous Daily    thiamine (VITAMIN B1) IVPB  100 mg Intravenous Q24H     PRN Meds: sodium chloride flush, LORazepam **OR** LORazepam **OR** LORazepam **OR** LORazepam **OR** LORazepam **OR** LORazepam **OR** LORazepam **OR** LORazepam, sodium chloride flush, magnesium hydroxide, ondansetron, potassium chloride **OR** potassium bicarb-citric acid **OR** potassium chloride    No intake or output data in the 24 hours ending 09/11/18 1500    Physical Exam Performed:    BP (!) 148/75   Pulse 86   Temp 98.4 °F (36.9 °C) (Oral)   Resp 16   Ht 5' 0.98\" (1.549 m)   Wt 110 lb 0.2 oz (49.9 kg)   SpO2 95%   Breastfeeding? No   BMI 20.80 kg/m²     General appearance: No apparent distress, appears stated age and cooperative. HEENT: Pupils equal, round, and reactive to light. Conjunctivae/corneas clear. Neck: Supple, with full range of motion. No jugular venous distention. Trachea midline. Respiratory:  Normal respiratory effort. Clear to auscultation, bilaterally without Rales/Wheezes/Rhonchi. Cardiovascular: Regular rate and rhythm with normal S1/S2 without murmurs, rubs or gallops.   Abdomen: Soft, non-tender, non-distended with normal bowel sounds. Musculoskeletal: No clubbing, cyanosis or edema bilaterally. Full range of motion without deformity. Skin: Skin color, texture, turgor normal.  No rashes or lesions. Neurologic:  Neurovascularly intact without any focal sensory/motor deficits. Cranial nerves: II-XII intact, grossly non-focal.  Psychiatric: Alert and oriented, thought content appropriate, normal insight No suicidal ideation  Capillary Refill: Brisk,< 3 seconds   Peripheral Pulses: +2 palpable, equal bilaterally       Labs:   Recent Labs      09/08/18   2337  09/10/18   0633   WBC  18.0*  10.2   HGB  14.9  13.9   HCT  45.6  41.6   PLT  337  315     Recent Labs      09/08/18   2337  09/10/18   0633   NA  134*  138   K  3.5  3.7   CL  94*  101   CO2  27  26   BUN  13  11   CREATININE  <0.5*  <0.5*   CALCIUM  9.7  9.3     Recent Labs      09/08/18   2337   AST  28   ALT  24   BILIDIR  <0.2   BILITOT  0.9   ALKPHOS  68     No results for input(s): INR in the last 72 hours. Recent Labs      09/08/18   2337   CKTOTAL  149       Urinalysis:      Lab Results   Component Value Date    NITRU Negative 09/08/2018    WBCUA 3-5 09/08/2018    BACTERIA 3+ 09/08/2018    RBCUA 20-50 09/08/2018    BLOODU LARGE 09/08/2018    SPECGRAV 1.020 09/08/2018    GLUCOSEU 100 09/08/2018       Radiology:  CT CERVICAL SPINE WO CONTRAST   Final Result     No acute fracture. CT Head WO Contrast   Final Result     No acute territorial infarct, intracranial hemorrhage, or significant    mass effect. Lacunar infarct in the right thalamus is favored to be subacute or    remote. MRI of the brain may be considered for further characterization,    if prior imaging is unavailable for comparison. XR CHEST PORTABLE   Final Result     No evidence of acute traumatic thoracic injury.               Plan:    Metabolic encephalopathy:  -Sec to intentional drug overdose  -Denies suicidal ideation   -Cont neuro checks, reorientation and suicide precautions with sitter at bedside   -Psych recommending inpatient psych after stabilization. Will ask for further recommendation given patient denies suicidal ideation and patient/family is in disagreement regarding inpatient psych    Alcohol Use:  -Drinks 1/drink per day  -IV thiamine discontinued   -Cont CIWA protocol    HTN:  -Monitor BP  -Amlodipine resumed    Hx of TIA:  -Aspirin resumed      DVT Prophylaxis: lovenox  Diet: DIET GENERAL;  Code Status: DNR-CC    PT/OT Eval Status: Evaluating    Dispo: TBD. Discussed inpatient psychiatry with the patient, niece, son and PCP. Pt/family not agreeable, want to consider returning back to the facility with PT/OT and health aide.     [facility]  Hospitalist  Attending Physician

## 2018-09-12 VITALS
RESPIRATION RATE: 16 BRPM | DIASTOLIC BLOOD PRESSURE: 77 MMHG | TEMPERATURE: 97.8 F | HEIGHT: 61 IN | HEART RATE: 84 BPM | WEIGHT: 110.01 LBS | SYSTOLIC BLOOD PRESSURE: 146 MMHG | BODY MASS INDEX: 20.77 KG/M2 | OXYGEN SATURATION: 94 %

## 2018-09-12 PROCEDURE — 2580000003 HC RX 258: Performed by: INTERNAL MEDICINE

## 2018-09-12 PROCEDURE — 6370000000 HC RX 637 (ALT 250 FOR IP): Performed by: INTERNAL MEDICINE

## 2018-09-12 PROCEDURE — 6360000002 HC RX W HCPCS: Performed by: INTERNAL MEDICINE

## 2018-09-12 RX ADMIN — Medication 10 ML: at 08:06

## 2018-09-12 RX ADMIN — ASPIRIN 81 MG: 81 TABLET, COATED ORAL at 08:05

## 2018-09-12 RX ADMIN — TIMOLOL MALEATE 1 DROP: 2.5 SOLUTION, GEL FORMING, EXTENDED RELEASE OPHTHALMIC at 08:07

## 2018-09-12 RX ADMIN — AMLODIPINE BESYLATE 5 MG: 5 TABLET ORAL at 08:05

## 2018-09-12 RX ADMIN — ENOXAPARIN SODIUM 40 MG: 40 INJECTION SUBCUTANEOUS at 08:05

## 2018-09-12 ASSESSMENT — PAIN SCALES - GENERAL
PAINLEVEL_OUTOF10: 0

## 2018-09-12 NOTE — PROGRESS NOTES
Patient given discharge orders. Report called to Poonam at [facility]. SW arranged transport. IV removed. Awaiting transport.

## 2018-09-12 NOTE — CONSULTS
PC was consulted, however, patient has a discharge order and a discharge plan set for 1600 to a inpatient psychiatric facility. She is already a DNR/CC and patient's son Manjinder Calvin is apparently actively involved in her care per documentation. Thank you for the consult. If further needs arise please reconsult.     Nevin Current, APRN/CNP  Palliative Care  [phone]

## 2018-09-12 NOTE — PLAN OF CARE
Problem: Falls - Risk of:  Goal: Will remain free from falls  Will remain free from falls   Outcome: Ongoing  Pt remains in fall precautions. Pt in room across from nurse's station. Bed locked and in low position. Bed alarm remains on. Sitter at bedside for suicide precautions. Pt assisted to and from BR with SBA and aide of walker. Pt tolerates well. Will continue to monitor safety with sitter and frequent rounding. Problem: Risk for Impaired Skin Integrity  Goal: Tissue integrity - skin and mucous membranes  Structural intactness and normal physiological function of skin and  mucous membranes. Outcome: Ongoing  Pt is on a low air-loss mattress. Pt has multiple abrasions from previous fall. Dressings in place, dry and intact. Pt is able to turn and reposition self in bed. No new areas of breakdown noted. Will continue to monitor skin integrity.   Electronically signed by Rosalia Longoria RN on 9/12/18 at 1:21 AM

## 2018-09-12 NOTE — CARE COORDINATION
LIAM spoke with transfer center [phone] who reports [facility] in pt psych unit does not have any beds available today. LIAM spoke with Miguel Quintanilla intake at [facility] [phone] and faxed [phone] updated clinical for review. Per Miguel Quintan[facility] at [facility] yesterday and discuss with physician on staff today. Addendum: 2;15pm LIAM spoke with Miguel Quintanilla admissions at [address] [place] [address] who can accept for in pt psych unit. JAYSON Pelayo will call report [phone]. LIAM spoke with pt and her son, Larisa Schwartz from [address] [place] [address] at bedside who is agreeable with plans. [facility] is scheduled for 4;00pm and statement of belief has been completed by Dr. Alisha Quintero and faxed to BR.     [facility]   [phone]
Therapy and Occupational Therapy 3x week  Home care at home?  No Provider: [facility] Provider [address] [place] [address]  [facility]  Pharmacy:NA  Potential Assistance Purchasing Medications:  No  Does patient want to participate in local refill/meds to beds program?: No    Goals of Care  Patient expects to be discharged to[de-identified] [facility]  Patient plans for SNF: [address]         Mode of transport from hospital: TBD    Factors facilitating achievement of predicted outcomes: Family support, Motivated, Cooperative and Pleasant    Barriers to discharge: medical clearance     Nedra Morales RN  [facility]  Case Management Department  Ph: [phone] Fax: [phone]
with the discharge plan.       Care Transitions patient: No AMY Graeme Olszewski, LSW  [facility]  Case Management Department  Ph: [phone]

## 2018-09-12 NOTE — PROGRESS NOTES
VSS. SR on telemetry. Pt has been sleeping quietly. Pt denies pain, nausea. Sitter remains at bedside. Pt has been calm, cooperative. No needs at this time. Will continue to monitor.   Electronically signed by Harry Severin, RN on 9/12/18 at 1:15 AM

## 2018-09-13 NOTE — DISCHARGE SUMMARY
rate and rhythm with normal S1/S2 without murmurs, rubs or gallops. Abdomen: Soft, non-tender, non-distended with normal bowel sounds. Musculoskeletal:  No clubbing, cyanosis or edema bilaterally. Full range of motion without deformity. Skin: Skin color, texture, turgor normal.  No rashes or lesions. Neurologic:  Decreased strength in hands and arms. Psychiatric:  Alert and oriented, thought content appropriate, normal insight  Capillary Refill: Brisk,< 3 seconds   Peripheral Pulses: +2 palpable, equal bilaterally       Labs: For convenience and continuity at follow-up the following most recent labs are provided:      CBC:    Lab Results   Component Value Date    WBC 10.2 09/10/2018    HGB 13.9 09/10/2018    HCT 41.6 09/10/2018     09/10/2018       Renal:    Lab Results   Component Value Date     09/10/2018    K 3.7 09/10/2018     09/10/2018    CO2 26 09/10/2018    BUN 11 09/10/2018    CREATININE <0.5 09/10/2018    CALCIUM 9.3 09/10/2018         Significant Diagnostic Studies    Radiology:   CT CERVICAL SPINE WO CONTRAST   Final Result     No acute fracture. CT Head WO Contrast   Final Result     No acute territorial infarct, intracranial hemorrhage, or significant    mass effect. Lacunar infarct in the right thalamus is favored to be subacute or    remote. MRI of the brain may be considered for further characterization,    if prior imaging is unavailable for comparison. XR CHEST PORTABLE   Final Result     No evidence of acute traumatic thoracic injury. Consults:     IP CONSULT TO HOSPITALIST  IP CONSULT TO PSYCHIATRY  IP CONSULT TO PALLIATIVE CARE    Disposition:  Inpatient psych    Condition at Discharge: Stable    Discharge Instructions/Follow-up:  F/U with PCP and neurosurgery.     Code Status: DNR-CC    Activity: activity as tolerated    Diet: regular diet      Discharge Medications:     Discharge Medication List as of 9/12/2018  3:06 PM

## 2018-09-20 LAB
EKG ATRIAL RATE: 80 BPM
EKG DIAGNOSIS: NORMAL
EKG P AXIS: 83 DEGREES
EKG P-R INTERVAL: 160 MS
EKG Q-T INTERVAL: 398 MS
EKG QRS DURATION: 80 MS
EKG QTC CALCULATION (BAZETT): 459 MS
EKG R AXIS: 1 DEGREES
EKG T AXIS: 14 DEGREES
EKG VENTRICULAR RATE: 80 BPM

## 2020-05-24 ENCOUNTER — APPOINTMENT (OUTPATIENT)
Dept: GENERAL RADIOLOGY | Age: 85
DRG: 470 | End: 2020-05-24
Payer: MEDICARE

## 2020-05-24 ENCOUNTER — APPOINTMENT (OUTPATIENT)
Dept: CT IMAGING | Age: 85
DRG: 470 | End: 2020-05-24
Payer: MEDICARE

## 2020-05-24 ENCOUNTER — HOSPITAL ENCOUNTER (INPATIENT)
Age: 85
LOS: 5 days | Discharge: NURSING, REHAB OR LONG-TERM CARE | DRG: 470 | End: 2020-05-29
Attending: EMERGENCY MEDICINE | Admitting: INTERNAL MEDICINE
Payer: MEDICARE

## 2020-05-24 PROBLEM — S72.002A CLOSED DISPLACED FRACTURE OF LEFT FEMORAL NECK (HCC): Status: ACTIVE | Noted: 2020-05-24

## 2020-05-24 LAB
ABO/RH: NORMAL
ANION GAP SERPL CALCULATED.3IONS-SCNC: 14 MMOL/L (ref 3–16)
ANTIBODY SCREEN: NORMAL
APTT: 36.3 SEC (ref 24.2–36.2)
BACTERIA: ABNORMAL /HPF
BASOPHILS ABSOLUTE: 0.1 K/UL (ref 0–0.2)
BASOPHILS RELATIVE PERCENT: 0.4 %
BILIRUBIN URINE: NEGATIVE
BLOOD, URINE: ABNORMAL
BUN BLDV-MCNC: 16 MG/DL (ref 7–20)
CALCIUM SERPL-MCNC: 9.9 MG/DL (ref 8.3–10.6)
CHLORIDE BLD-SCNC: 98 MMOL/L (ref 99–110)
CLARITY: CLEAR
CO2: 27 MMOL/L (ref 21–32)
COLOR: YELLOW
CREAT SERPL-MCNC: <0.5 MG/DL (ref 0.6–1.2)
EOSINOPHILS ABSOLUTE: 0 K/UL (ref 0–0.6)
EOSINOPHILS RELATIVE PERCENT: 0.1 %
EPITHELIAL CELLS, UA: ABNORMAL /HPF (ref 0–5)
GFR AFRICAN AMERICAN: >60
GFR NON-AFRICAN AMERICAN: >60
GLUCOSE BLD-MCNC: 211 MG/DL (ref 70–99)
GLUCOSE URINE: NEGATIVE MG/DL
HCT VFR BLD CALC: 46.9 % (ref 36–48)
HEMOGLOBIN: 15.7 G/DL (ref 12–16)
INR BLD: 0.97 (ref 0.86–1.14)
KETONES, URINE: NEGATIVE MG/DL
LEUKOCYTE ESTERASE, URINE: NEGATIVE
LYMPHOCYTES ABSOLUTE: 1.1 K/UL (ref 1–5.1)
LYMPHOCYTES RELATIVE PERCENT: 8 %
MCH RBC QN AUTO: 32.7 PG (ref 26–34)
MCHC RBC AUTO-ENTMCNC: 33.4 G/DL (ref 31–36)
MCV RBC AUTO: 97.9 FL (ref 80–100)
MICROSCOPIC EXAMINATION: YES
MONOCYTES ABSOLUTE: 0.8 K/UL (ref 0–1.3)
MONOCYTES RELATIVE PERCENT: 5.4 %
NEUTROPHILS ABSOLUTE: 12.1 K/UL (ref 1.7–7.7)
NEUTROPHILS RELATIVE PERCENT: 86.1 %
NITRITE, URINE: NEGATIVE
PDW BLD-RTO: 14 % (ref 12.4–15.4)
PH UA: 6 (ref 5–8)
PLATELET # BLD: 229 K/UL (ref 135–450)
PMV BLD AUTO: 7.2 FL (ref 5–10.5)
POTASSIUM REFLEX MAGNESIUM: 3.8 MMOL/L (ref 3.5–5.1)
PRO-BNP: 155 PG/ML (ref 0–449)
PROCALCITONIN: 0.06 NG/ML (ref 0–0.15)
PROTEIN UA: NEGATIVE MG/DL
PROTHROMBIN TIME: 11.3 SEC (ref 10–13.2)
RBC # BLD: 4.8 M/UL (ref 4–5.2)
RBC UA: ABNORMAL /HPF (ref 0–4)
REPORT: NORMAL
SARS-COV-2: NOT DETECTED
SODIUM BLD-SCNC: 139 MMOL/L (ref 136–145)
SPECIFIC GRAVITY UA: 1.01 (ref 1–1.03)
THIS TEST SENT TO: NORMAL
TOTAL CK: 32 U/L (ref 26–192)
TROPONIN: <0.01 NG/ML
URINE TYPE: ABNORMAL
UROBILINOGEN, URINE: 0.2 E.U./DL
WBC # BLD: 14 K/UL (ref 4–11)
WBC UA: ABNORMAL /HPF (ref 0–5)

## 2020-05-24 PROCEDURE — 85610 PROTHROMBIN TIME: CPT

## 2020-05-24 PROCEDURE — 6370000000 HC RX 637 (ALT 250 FOR IP): Performed by: EMERGENCY MEDICINE

## 2020-05-24 PROCEDURE — 81001 URINALYSIS AUTO W/SCOPE: CPT

## 2020-05-24 PROCEDURE — 80048 BASIC METABOLIC PNL TOTAL CA: CPT

## 2020-05-24 PROCEDURE — U0003 INFECTIOUS AGENT DETECTION BY NUCLEIC ACID (DNA OR RNA); SEVERE ACUTE RESPIRATORY SYNDROME CORONAVIRUS 2 (SARS-COV-2) (CORONAVIRUS DISEASE [COVID-19]), AMPLIFIED PROBE TECHNIQUE, MAKING USE OF HIGH THROUGHPUT TECHNOLOGIES AS DESCRIBED BY CMS-2020-01-R: HCPCS

## 2020-05-24 PROCEDURE — 93005 ELECTROCARDIOGRAM TRACING: CPT | Performed by: EMERGENCY MEDICINE

## 2020-05-24 PROCEDURE — 86850 RBC ANTIBODY SCREEN: CPT

## 2020-05-24 PROCEDURE — 99285 EMERGENCY DEPT VISIT HI MDM: CPT

## 2020-05-24 PROCEDURE — 85730 THROMBOPLASTIN TIME PARTIAL: CPT

## 2020-05-24 PROCEDURE — 6370000000 HC RX 637 (ALT 250 FOR IP): Performed by: INTERNAL MEDICINE

## 2020-05-24 PROCEDURE — 70450 CT HEAD/BRAIN W/O DYE: CPT

## 2020-05-24 PROCEDURE — 2580000003 HC RX 258: Performed by: EMERGENCY MEDICINE

## 2020-05-24 PROCEDURE — 71045 X-RAY EXAM CHEST 1 VIEW: CPT

## 2020-05-24 PROCEDURE — 6360000002 HC RX W HCPCS: Performed by: EMERGENCY MEDICINE

## 2020-05-24 PROCEDURE — 84484 ASSAY OF TROPONIN QUANT: CPT

## 2020-05-24 PROCEDURE — 72125 CT NECK SPINE W/O DYE: CPT

## 2020-05-24 PROCEDURE — 96374 THER/PROPH/DIAG INJ IV PUSH: CPT

## 2020-05-24 PROCEDURE — 83880 ASSAY OF NATRIURETIC PEPTIDE: CPT

## 2020-05-24 PROCEDURE — 86900 BLOOD TYPING SEROLOGIC ABO: CPT

## 2020-05-24 PROCEDURE — 85025 COMPLETE CBC W/AUTO DIFF WBC: CPT

## 2020-05-24 PROCEDURE — 73502 X-RAY EXAM HIP UNI 2-3 VIEWS: CPT

## 2020-05-24 PROCEDURE — 1200000000 HC SEMI PRIVATE

## 2020-05-24 PROCEDURE — 84145 PROCALCITONIN (PCT): CPT

## 2020-05-24 PROCEDURE — 86901 BLOOD TYPING SEROLOGIC RH(D): CPT

## 2020-05-24 PROCEDURE — 2580000003 HC RX 258: Performed by: INTERNAL MEDICINE

## 2020-05-24 PROCEDURE — 82550 ASSAY OF CK (CPK): CPT

## 2020-05-24 RX ORDER — ACETAMINOPHEN 500 MG
1000 TABLET ORAL 3 TIMES DAILY
COMMUNITY

## 2020-05-24 RX ORDER — AMOXICILLIN 250 MG
1 CAPSULE ORAL DAILY PRN
COMMUNITY

## 2020-05-24 RX ORDER — UREA 10 %
3 LOTION (ML) TOPICAL NIGHTLY PRN
Status: DISCONTINUED | OUTPATIENT
Start: 2020-05-24 | End: 2020-05-29 | Stop reason: HOSPADM

## 2020-05-24 RX ORDER — DIAZEPAM 2 MG/1
2 TABLET ORAL EVERY EVENING
COMMUNITY

## 2020-05-24 RX ORDER — MORPHINE SULFATE 2 MG/ML
2 INJECTION, SOLUTION INTRAMUSCULAR; INTRAVENOUS ONCE
Status: COMPLETED | OUTPATIENT
Start: 2020-05-24 | End: 2020-05-24

## 2020-05-24 RX ORDER — POLYETHYLENE GLYCOL 3350 17 G/17G
17 POWDER, FOR SOLUTION ORAL DAILY
Status: ON HOLD | COMMUNITY
End: 2020-05-26 | Stop reason: HOSPADM

## 2020-05-24 RX ORDER — HYDROCODONE BITARTRATE AND ACETAMINOPHEN 5; 325 MG/1; MG/1
1 TABLET ORAL EVERY 4 HOURS PRN
Status: DISCONTINUED | OUTPATIENT
Start: 2020-05-24 | End: 2020-05-29 | Stop reason: HOSPADM

## 2020-05-24 RX ORDER — BISACODYL 10 MG
10 SUPPOSITORY, RECTAL RECTAL DAILY PRN
COMMUNITY

## 2020-05-24 RX ORDER — POLYETHYLENE GLYCOL 3350 17 G/17G
17 POWDER, FOR SOLUTION ORAL DAILY PRN
COMMUNITY

## 2020-05-24 RX ORDER — LOPERAMIDE HYDROCHLORIDE 2 MG/1
2 CAPSULE ORAL 4 TIMES DAILY PRN
Status: DISCONTINUED | OUTPATIENT
Start: 2020-05-24 | End: 2020-05-29 | Stop reason: HOSPADM

## 2020-05-24 RX ORDER — CALCIUM CARBONATE 500(1250)
500 TABLET ORAL DAILY
Status: DISCONTINUED | OUTPATIENT
Start: 2020-05-24 | End: 2020-05-25 | Stop reason: CLARIF

## 2020-05-24 RX ORDER — OXYCODONE HYDROCHLORIDE AND ACETAMINOPHEN 5; 325 MG/1; MG/1
1 TABLET ORAL EVERY 6 HOURS PRN
COMMUNITY

## 2020-05-24 RX ORDER — PANTOPRAZOLE SODIUM 20 MG/1
20 TABLET, DELAYED RELEASE ORAL
Status: DISCONTINUED | OUTPATIENT
Start: 2020-05-25 | End: 2020-05-29 | Stop reason: HOSPADM

## 2020-05-24 RX ORDER — SACCHAROMYCES BOULARDII 250 MG
250 CAPSULE ORAL 2 TIMES DAILY
Status: DISCONTINUED | OUTPATIENT
Start: 2020-05-24 | End: 2020-05-25 | Stop reason: CLARIF

## 2020-05-24 RX ORDER — LANOLIN ALCOHOL/MO/W.PET/CERES
6 CREAM (GRAM) TOPICAL EVERY EVENING
COMMUNITY

## 2020-05-24 RX ORDER — SODIUM CHLORIDE 0.9 % (FLUSH) 0.9 %
10 SYRINGE (ML) INJECTION EVERY 12 HOURS SCHEDULED
Status: DISCONTINUED | OUTPATIENT
Start: 2020-05-24 | End: 2020-05-29 | Stop reason: HOSPADM

## 2020-05-24 RX ORDER — GINSENG 100 MG
CAPSULE ORAL DAILY
COMMUNITY

## 2020-05-24 RX ORDER — 0.9 % SODIUM CHLORIDE 0.9 %
500 INTRAVENOUS SOLUTION INTRAVENOUS ONCE
Status: DISCONTINUED | OUTPATIENT
Start: 2020-05-24 | End: 2020-05-24

## 2020-05-24 RX ORDER — GLIMEPIRIDE 2 MG/1
1 TABLET ORAL DAILY
Status: DISCONTINUED | OUTPATIENT
Start: 2020-05-24 | End: 2020-05-29 | Stop reason: HOSPADM

## 2020-05-24 RX ORDER — ACETAMINOPHEN 325 MG/1
650 TABLET ORAL EVERY 6 HOURS PRN
Status: ON HOLD | COMMUNITY
End: 2020-05-26 | Stop reason: HOSPADM

## 2020-05-24 RX ORDER — DULOXETIN HYDROCHLORIDE 30 MG/1
90 CAPSULE, DELAYED RELEASE ORAL DAILY
COMMUNITY

## 2020-05-24 RX ORDER — POLYETHYLENE GLYCOL 3350 17 G/17G
17 POWDER, FOR SOLUTION ORAL DAILY PRN
Status: DISCONTINUED | OUTPATIENT
Start: 2020-05-24 | End: 2020-05-29 | Stop reason: HOSPADM

## 2020-05-24 RX ORDER — SODIUM CHLORIDE 0.9 % (FLUSH) 0.9 %
10 SYRINGE (ML) INJECTION PRN
Status: DISCONTINUED | OUTPATIENT
Start: 2020-05-24 | End: 2020-05-29 | Stop reason: HOSPADM

## 2020-05-24 RX ORDER — ACETAMINOPHEN 650 MG/1
650 SUPPOSITORY RECTAL EVERY 6 HOURS PRN
Status: DISCONTINUED | OUTPATIENT
Start: 2020-05-24 | End: 2020-05-29 | Stop reason: HOSPADM

## 2020-05-24 RX ORDER — OXYCODONE HYDROCHLORIDE AND ACETAMINOPHEN 5; 325 MG/1; MG/1
1 TABLET ORAL EVERY EVENING
COMMUNITY
End: 2021-04-29 | Stop reason: SDUPTHER

## 2020-05-24 RX ORDER — AMLODIPINE BESYLATE 5 MG/1
5 TABLET ORAL DAILY
Status: DISCONTINUED | OUTPATIENT
Start: 2020-05-25 | End: 2020-05-28

## 2020-05-24 RX ORDER — LABETALOL 20 MG/4 ML (5 MG/ML) INTRAVENOUS SYRINGE
10 EVERY 4 HOURS PRN
Status: DISCONTINUED | OUTPATIENT
Start: 2020-05-24 | End: 2020-05-29 | Stop reason: HOSPADM

## 2020-05-24 RX ORDER — FUROSEMIDE 10 MG/ML
40 INJECTION INTRAMUSCULAR; INTRAVENOUS ONCE
Status: COMPLETED | OUTPATIENT
Start: 2020-05-24 | End: 2020-05-24

## 2020-05-24 RX ORDER — SENNA PLUS 8.6 MG/1
1 TABLET ORAL DAILY
Status: ON HOLD | COMMUNITY
End: 2020-05-26 | Stop reason: HOSPADM

## 2020-05-24 RX ORDER — HYDROCODONE BITARTRATE AND ACETAMINOPHEN 5; 325 MG/1; MG/1
2 TABLET ORAL EVERY 4 HOURS PRN
Status: DISCONTINUED | OUTPATIENT
Start: 2020-05-24 | End: 2020-05-29 | Stop reason: HOSPADM

## 2020-05-24 RX ORDER — ONDANSETRON 2 MG/ML
4 INJECTION INTRAMUSCULAR; INTRAVENOUS EVERY 6 HOURS PRN
Status: DISCONTINUED | OUTPATIENT
Start: 2020-05-24 | End: 2020-05-29 | Stop reason: HOSPADM

## 2020-05-24 RX ORDER — ACETAMINOPHEN 325 MG/1
650 TABLET ORAL EVERY 6 HOURS PRN
Status: DISCONTINUED | OUTPATIENT
Start: 2020-05-24 | End: 2020-05-29 | Stop reason: HOSPADM

## 2020-05-24 RX ORDER — PROMETHAZINE HYDROCHLORIDE 25 MG/1
12.5 TABLET ORAL EVERY 6 HOURS PRN
Status: DISCONTINUED | OUTPATIENT
Start: 2020-05-24 | End: 2020-05-29 | Stop reason: HOSPADM

## 2020-05-24 RX ORDER — LATANOPROST 50 UG/ML
1 SOLUTION/ DROPS OPHTHALMIC NIGHTLY
Status: DISCONTINUED | OUTPATIENT
Start: 2020-05-24 | End: 2020-05-29 | Stop reason: HOSPADM

## 2020-05-24 RX ADMIN — SODIUM CHLORIDE 500 ML: 9 INJECTION, SOLUTION INTRAVENOUS at 16:10

## 2020-05-24 RX ADMIN — TIMOLOL MALEATE 1 DROP: 2.5 SOLUTION OPHTHALMIC at 20:18

## 2020-05-24 RX ADMIN — Medication 10 ML: at 20:40

## 2020-05-24 RX ADMIN — LATANOPROST 1 DROP: 50 SOLUTION OPHTHALMIC at 20:19

## 2020-05-24 RX ADMIN — HYDROMORPHONE HYDROCHLORIDE 0.5 MG: 1 INJECTION, SOLUTION INTRAMUSCULAR; INTRAVENOUS; SUBCUTANEOUS at 17:44

## 2020-05-24 RX ADMIN — MORPHINE SULFATE 2 MG: 2 INJECTION, SOLUTION INTRAMUSCULAR; INTRAVENOUS at 16:09

## 2020-05-24 RX ADMIN — NITROGLYCERIN 1 INCH: 20 OINTMENT TOPICAL at 17:44

## 2020-05-24 RX ADMIN — FUROSEMIDE 40 MG: 10 INJECTION, SOLUTION INTRAMUSCULAR; INTRAVENOUS at 17:43

## 2020-05-24 ASSESSMENT — PAIN DESCRIPTION - LOCATION
LOCATION: HIP

## 2020-05-24 ASSESSMENT — PAIN SCALES - GENERAL
PAINLEVEL_OUTOF10: 8
PAINLEVEL_OUTOF10: 7
PAINLEVEL_OUTOF10: 1
PAINLEVEL_OUTOF10: 1
PAINLEVEL_OUTOF10: 7
PAINLEVEL_OUTOF10: 0

## 2020-05-24 ASSESSMENT — PAIN DESCRIPTION - PROGRESSION
CLINICAL_PROGRESSION: GRADUALLY IMPROVING
CLINICAL_PROGRESSION: GRADUALLY IMPROVING

## 2020-05-24 ASSESSMENT — PAIN DESCRIPTION - ORIENTATION
ORIENTATION: LEFT

## 2020-05-24 ASSESSMENT — PAIN DESCRIPTION - DESCRIPTORS
DESCRIPTORS: ACHING;SHARP
DESCRIPTORS: ACHING;SHARP
DESCRIPTORS: CONSTANT;SHARP

## 2020-05-24 ASSESSMENT — PAIN DESCRIPTION - FREQUENCY
FREQUENCY: CONTINUOUS
FREQUENCY: CONTINUOUS

## 2020-05-24 ASSESSMENT — PAIN DESCRIPTION - ONSET
ONSET: ON-GOING
ONSET: GRADUAL

## 2020-05-24 ASSESSMENT — PAIN DESCRIPTION - PAIN TYPE
TYPE: ACUTE PAIN
TYPE: ACUTE PAIN

## 2020-05-24 ASSESSMENT — PAIN - FUNCTIONAL ASSESSMENT: PAIN_FUNCTIONAL_ASSESSMENT: PREVENTS OR INTERFERES SOME ACTIVE ACTIVITIES AND ADLS

## 2020-05-24 NOTE — H&P
prior study from 9/9/2018, patient has undergone posterior cervical decompression as described. 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 CT HEAD WO CONTRAST   Final Result      No acute hemorrhage. .      Marked soft tissue abnormality posterior to the dens present previously with marked mass effect on the cervical medullary junction. ASSESSMENT:    Active Hospital Problems    Diagnosis Date Noted    Closed displaced fracture of left femoral neck ([address] [S72.002A] 05/24/2020     #Mechanical Fall  #Left Femur Neck Fracture. On baseline patient is wheelchair bound. She is DNR CCA. I believe she is not a good surgical candidate will leave final decision to orthopedic regarding this. She will likely need ECHO is there is plan for any surgical intervention. PT/OT consulted. Pain control with norco.     # Hypoxia  Will use incentive spirometer. #CXR showed interstitial changes suggestive of CHF on exam she looks dehydrated. Will hold on diuretics. Check BNP, pro calcitonin to rule out infectious etiology. COVID 19 rule out as she is resident of [facility]    #HTN accelerated likely in setting of pain  Resume amlodipine 5mg. PRN labetalol. Pain control    # Age Related debility    DVT Prophylaxis: Lovenox  Diet: No diet orders on file Cardiac  Code Status: Prior [facility]    PT/OT Eval Status: ordered     Dispo - inpatient. This chart was likely completed using voice recognition technology and may contain unintended grammatical , phraseology,and/or punctuation errors       Sana Stewart MD    Thank you Ray Reyes for the opportunity to be involved in this patient's care. If you have any questions or concerns please feel free to contact me at [phone].

## 2020-05-24 NOTE — ED PROVIDER NOTES
[address]          ATTENDING PHYSICIAN NOTE       Date of evaluation: 5/24/2020    Chief Complaint     Hip Pain      History of Present Illness     Patricio Lorenzana is a 80 y.o. female with PMH of HTN, Chronic degenerative disc changes, GERD, HLD, HTN, who presents to the ED for fall and left hip pain.  -Patient is brought in from her nursing home [facility] for concern for mechanical fall.  -She states that she was in bed and was trying to get help to get out of bed and fell onto her left hip.  -She denies hitting her head or losing consciousness but is complaining of some neck pain.  -She had x-rays done at her nursing facility that were concerning for a possible femoral neck fracture.  -Patient was also notably hypoxic on arrival and states that she has been feeling short of breath today. She denies any chest pain  -Pt was not down on the floor for long. Bedsides as noted above, no other aggravating or alleviating factors. Review of Systems     Review of Systems    All Other systems reviewed and are negative    Past Medical, Surgical, Family, and Social History     She has a past medical history of Anxiety, Benign neoplasm of colon, Benign thyroid cyst, Bone/cartilage disorder, Chronic diarrhea, Depression, Enterocolitis, Esophagitis, GERD (gastroesophageal reflux disease), H/O Clostridium difficile infection, [place] (hard of hearing), Hyperlipidemia, Hypertension, Insomnia, Osteoporosis, Pinched nerve in neck, Rotator cuff tendinitis, Sciatica, Skin cancer, Stenosis, spinal, lumbar, TIA (transient ischemic attack), and Vitamin D deficiency. She has a past surgical history that includes Cataract removal; knee surgery; and back surgery. Her family history is not on file. She has an unknown smoking status. She has never used smokeless tobacco. She reports previous alcohol use.     Medications     Previous Medications    AMLODIPINE (NORVASC) 5 MG TABLET    Take 5 mg by Result   1. No evidence of cervical fracture or acute subluxation. Stable grade 1 anterolisthesis of C7 on T1.   2. Since prior study from 9/9/2018, patient has undergone posterior cervical decompression as described. 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 CT HEAD WO CONTRAST   Final Result      No acute hemorrhage. .      Marked soft tissue abnormality posterior to the dens present previously with marked mass effect on the cervical medullary junction.           LABS:   Results for orders placed or performed during the hospital encounter of 05/24/20   CBC Auto Differential   Result Value Ref Range    WBC 14.0 (H) 4.0 - 11.0 K/uL    RBC 4.80 4.00 - 5.20 M/uL    Hemoglobin 15.7 12.0 - 16.0 g/dL    Hematocrit 46.9 36.0 - 48.0 %    MCV 97.9 80.0 - 100.0 fL    MCH 32.7 26.0 - 34.0 pg    MCHC 33.4 31.0 - 36.0 g/dL    RDW 14.0 12.4 - 15.4 %    Platelets 329 184 - 378 K/uL    MPV 7.2 5.0 - 10.5 fL    Neutrophils % 86.1 %    Lymphocytes % 8.0 %    Monocytes % 5.4 %    Eosinophils % 0.1 %    Basophils % 0.4 %    Neutrophils Absolute 12.1 (H) 1.7 - 7.7 K/uL    Lymphocytes Absolute 1.1 1.0 - 5.1 K/uL    Monocytes Absolute 0.8 0.0 - 1.3 K/uL    Eosinophils Absolute 0.0 0.0 - 0.6 K/uL    Basophils Absolute 0.1 0.0 - 0.2 K/uL   Basic Metabolic Panel w/ Reflex to MG   Result Value Ref Range    Sodium 139 136 - 145 mmol/L    Potassium reflex Magnesium 3.8 3.5 - 5.1 mmol/L    Chloride 98 (L) 99 - 110 mmol/L    CO2 27 21 - 32 mmol/L    Anion Gap 14 3 - 16    Glucose 211 (H) 70 - 99 mg/dL    BUN 16 7 - 20 mg/dL    CREATININE <0.5 (L) 0.6 - 1.2 mg/dL    GFR Non-African American >60 >60    GFR African American >60 >60    Calcium 9.9 8.3 - 10.6 mg/dL   Troponin   Result Value Ref Range    Troponin <0.01 <0.01 ng/mL   Protime-INR   Result Value Ref Range    Protime 11.3 10.0 - 13.2 sec    INR 0.97 0.86 - 1.14   APTT   Result Value Ref Range    aPTT 36.3 (H) 24.2 - 36.2 sec   CK   Result Value Ref Range    Total CK 32 26 - 192 U/L   TYPE AND SCREEN   Result Value Ref Range    ABO/Rh O POS     Antibody Screen NEG          RECENT VITALS:  BP: (!) 184/106,Temp: 98.9 °F (37.2 °C), Pulse: 107, Resp: 24, SpO2: 96 %     Procedures         ED Course     Nursing Notes, Past Medical Hx, Past Surgical Hx, Social Hx,Allergies, and Family Hx were reviewed. patient was given the following medications:  Orders Placed This Encounter   Medications    morphine (PF) injection 2 mg    DISCONTD: 0.9 % sodium chloride bolus    nitroglycerin (NITRO-BID) 2 % ointment 1 inch    furosemide (LASIX) injection 40 mg    HYDROmorphone (DILAUDID) injection 0.5 mg       CONSULTS:  IP CONSULT TO ORTHOPEDIC SURGERY  IP CONSULT TO HOSPITALIST  IP CONSULT TO SOCIAL WORK    MEDICAL DECISIONMAKING / ASSESSMENT / Milna Luis Miguel is a 80 y.o. female with PMH of HTN, Chronic degenerative disc changes, GERD, HLD, HTN, who presents to the ED for fall and left hip pain. MDM: Patient presents for evaluation after a fall at her nursing facility. Her imaging is consistent with a mildly displaced left femoral neck fracture subcapital.  She is neurovascular intact her left lower extremity is not any anticoagulation. However, it also seems that patient has some mild pulmonary edema. Given her elevated blood pressure and mild pulmonary edema and oxygen requirement, will treat with Nitropaste and Lasix. Patient got about 400mL of normal saline as initially I was thinking she was volume down given dry mucosa. Patient is also DNR CC and clearly has some medical core abilities that at this time would complicate a left ROXANNA. I spoke to Dr. Lucia Laureano from orthopedics at [phone] who will see pt tomorrow. I spoke to Dr. Linda Richardson from [facility] who will admit pt. CT brain and C-spine obtained and are negative for acute processes. Clinical Impression     1. Left displaced femoral neck fracture ([address]    2.  Acute on chronic congestive

## 2020-05-25 LAB
ANION GAP SERPL CALCULATED.3IONS-SCNC: 13 MMOL/L (ref 3–16)
BUN BLDV-MCNC: 17 MG/DL (ref 7–20)
CALCIUM SERPL-MCNC: 9.8 MG/DL (ref 8.3–10.6)
CHLORIDE BLD-SCNC: 100 MMOL/L (ref 99–110)
CO2: 28 MMOL/L (ref 21–32)
CREAT SERPL-MCNC: <0.5 MG/DL (ref 0.6–1.2)
EKG ATRIAL RATE: 109 BPM
EKG DIAGNOSIS: NORMAL
EKG P AXIS: 39 DEGREES
EKG P-R INTERVAL: 190 MS
EKG Q-T INTERVAL: 326 MS
EKG QRS DURATION: 68 MS
EKG QTC CALCULATION (BAZETT): 439 MS
EKG R AXIS: 13 DEGREES
EKG T AXIS: 23 DEGREES
EKG VENTRICULAR RATE: 109 BPM
GFR AFRICAN AMERICAN: >60
GFR NON-AFRICAN AMERICAN: >60
GLUCOSE BLD-MCNC: 150 MG/DL (ref 70–99)
HCT VFR BLD CALC: 42.5 % (ref 36–48)
HEMOGLOBIN: 14.5 G/DL (ref 12–16)
MCH RBC QN AUTO: 33.2 PG (ref 26–34)
MCHC RBC AUTO-ENTMCNC: 34 G/DL (ref 31–36)
MCV RBC AUTO: 97.4 FL (ref 80–100)
PDW BLD-RTO: 14.2 % (ref 12.4–15.4)
PLATELET # BLD: 216 K/UL (ref 135–450)
PMV BLD AUTO: 7.7 FL (ref 5–10.5)
POTASSIUM REFLEX MAGNESIUM: 3.9 MMOL/L (ref 3.5–5.1)
RBC # BLD: 4.36 M/UL (ref 4–5.2)
SODIUM BLD-SCNC: 141 MMOL/L (ref 136–145)
WBC # BLD: 13.5 K/UL (ref 4–11)

## 2020-05-25 PROCEDURE — 6370000000 HC RX 637 (ALT 250 FOR IP): Performed by: INTERNAL MEDICINE

## 2020-05-25 PROCEDURE — 80048 BASIC METABOLIC PNL TOTAL CA: CPT

## 2020-05-25 PROCEDURE — 2580000003 HC RX 258: Performed by: INTERNAL MEDICINE

## 2020-05-25 PROCEDURE — 85027 COMPLETE CBC AUTOMATED: CPT

## 2020-05-25 PROCEDURE — 94761 N-INVAS EAR/PLS OXIMETRY MLT: CPT

## 2020-05-25 PROCEDURE — 6360000002 HC RX W HCPCS: Performed by: INTERNAL MEDICINE

## 2020-05-25 PROCEDURE — 1200000000 HC SEMI PRIVATE

## 2020-05-25 PROCEDURE — 2700000000 HC OXYGEN THERAPY PER DAY

## 2020-05-25 PROCEDURE — 99223 1ST HOSP IP/OBS HIGH 75: CPT | Performed by: INTERNAL MEDICINE

## 2020-05-25 RX ORDER — METOPROLOL SUCCINATE 25 MG/1
25 TABLET, EXTENDED RELEASE ORAL DAILY
Status: DISCONTINUED | OUTPATIENT
Start: 2020-05-25 | End: 2020-05-29 | Stop reason: HOSPADM

## 2020-05-25 RX ADMIN — Medication 10 ML: at 20:45

## 2020-05-25 RX ADMIN — TIMOLOL MALEATE 1 DROP: 2.5 SOLUTION OPHTHALMIC at 09:59

## 2020-05-25 RX ADMIN — Medication 10 ML: at 10:00

## 2020-05-25 RX ADMIN — METOPROLOL SUCCINATE 25 MG: 25 TABLET, EXTENDED RELEASE ORAL at 15:28

## 2020-05-25 RX ADMIN — HYDROCODONE BITARTRATE AND ACETAMINOPHEN 1 TABLET: 5; 325 TABLET ORAL at 15:28

## 2020-05-25 RX ADMIN — AMLODIPINE BESYLATE 5 MG: 5 TABLET ORAL at 09:59

## 2020-05-25 RX ADMIN — DULOXETINE HYDROCHLORIDE 90 MG: 60 CAPSULE, DELAYED RELEASE ORAL at 09:59

## 2020-05-25 RX ADMIN — LATANOPROST 1 DROP: 50 SOLUTION OPHTHALMIC at 20:45

## 2020-05-25 RX ADMIN — HYDROCODONE BITARTRATE AND ACETAMINOPHEN 2 TABLET: 5; 325 TABLET ORAL at 03:17

## 2020-05-25 RX ADMIN — ENOXAPARIN SODIUM 40 MG: 40 INJECTION SUBCUTANEOUS at 09:59

## 2020-05-25 ASSESSMENT — PAIN DESCRIPTION - LOCATION
LOCATION: HIP
LOCATION: HIP
LOCATION: LEG

## 2020-05-25 ASSESSMENT — PAIN DESCRIPTION - PAIN TYPE
TYPE: ACUTE PAIN

## 2020-05-25 ASSESSMENT — PAIN SCALES - GENERAL
PAINLEVEL_OUTOF10: 4
PAINLEVEL_OUTOF10: 6
PAINLEVEL_OUTOF10: 7
PAINLEVEL_OUTOF10: 1
PAINLEVEL_OUTOF10: 0

## 2020-05-25 ASSESSMENT — PAIN DESCRIPTION - PROGRESSION
CLINICAL_PROGRESSION: NOT CHANGED
CLINICAL_PROGRESSION: GRADUALLY IMPROVING
CLINICAL_PROGRESSION: GRADUALLY WORSENING

## 2020-05-25 ASSESSMENT — PAIN DESCRIPTION - DESCRIPTORS
DESCRIPTORS: ACHING;SHARP
DESCRIPTORS: ACHING
DESCRIPTORS: ACHING

## 2020-05-25 ASSESSMENT — PAIN - FUNCTIONAL ASSESSMENT
PAIN_FUNCTIONAL_ASSESSMENT: PREVENTS OR INTERFERES SOME ACTIVE ACTIVITIES AND ADLS

## 2020-05-25 ASSESSMENT — PULMONARY FUNCTION TESTS: PEFR_L/MIN: 20

## 2020-05-25 ASSESSMENT — PAIN DESCRIPTION - ORIENTATION
ORIENTATION: LEFT

## 2020-05-25 ASSESSMENT — PAIN DESCRIPTION - ONSET
ONSET: ON-GOING

## 2020-05-25 ASSESSMENT — PAIN DESCRIPTION - FREQUENCY
FREQUENCY: CONTINUOUS
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
## 2020-05-25 NOTE — PLAN OF CARE
Problem: Falls - Risk of:  Goal: Will remain free from falls  Description: Will remain free from falls  5/25/2020 0543 by Kris Funk RN  Outcome: Ongoing     Problem: Pain:  Goal: Pain level will decrease  Description: Pain level will decrease  Outcome: Ongoing     Problem: Skin Integrity:  Goal: Will show no infection signs and symptoms  Description: Will show no infection signs and symptoms  Outcome: Ongoing

## 2020-05-25 NOTE — CONSULTS
acetaminophen **OR** HYDROcodone 5 mg - acetaminophen, labetalol, melatonin      Allergies:  Patient has no known allergies. Review of Systems:     A 14 ROS obtained and negative except as mentioned in HPI. · Constitutional: there has been no unanticipated weight loss. · Eyes: No visual changes or diplopia. No scleral icterus. · ENT: No Headaches, hearing loss or vertigo. No mouth sores or sore throat. · Cardiovascular: No loss of consciousness. No hemoptysis, pleuritic pain, or phlebitis. · Respiratory: No cough or wheezing, no sputum production. No hematemesis. · Gastrointestinal: No abdominal pain, appetite loss, blood in stools. No change in bowel or bladder habits. · Genitourinary: No dysuria, or hematuria. · Musculoskeletal:  No gait disturbance, weakness or joint complaints. · Integumentary: No rash or pruritis. · Neurological: No headache, diplopia,numbness or tingling. No change in gait, balance, coordination, mood, affect, memory, mentation, behavior. · Psychiatric: No anxiety,  · Endocrine: No malaise,  · Hematologic/Lymphatic: No abnormal bruising  · Allergic/Immunologic: No nasal congestion      Physical Examination:    Vitals:    05/25/20 0812   BP: 136/63   Pulse: 80   Resp: 16   Temp: 97 °F (36.1 °C)   SpO2: 96%    Weight: 119 lb 7.8 oz (54.2 kg)         General Appearance:  Alert, cooperative, no distress, appears stated age   Head:  Normocephalic, without obvious abnormality, atraumatic   Eyes:  PERRL   Nose: Nares normal,   Neck: Supple, JVP normal   Lungs:   Clear to auscultation bilaterally, respirations unlabored   Chest Wall:  No tenderness or deformity   Heart:  Regular rate and rhythm, normal S1, S2 normal, no murmur, No rub. No S3 / S4 gallop   Abdomen:   Soft, non-tender, +bowel sounds   Extremities: no cyanosis, no clubbing , No edema   Pulses: Symmetric extremities   Skin: no gross lesions or rashes   Pysch: Normal mood and affect   Neurologic: No gross deficits.   CN

## 2020-05-26 ENCOUNTER — ANESTHESIA EVENT (OUTPATIENT)
Dept: OPERATING ROOM | Age: 85
DRG: 470 | End: 2020-05-26
Payer: MEDICARE

## 2020-05-26 ENCOUNTER — APPOINTMENT (OUTPATIENT)
Dept: GENERAL RADIOLOGY | Age: 85
DRG: 470 | End: 2020-05-26
Payer: MEDICARE

## 2020-05-26 ENCOUNTER — ANESTHESIA (OUTPATIENT)
Dept: OPERATING ROOM | Age: 85
DRG: 470 | End: 2020-05-26
Payer: MEDICARE

## 2020-05-26 VITALS — SYSTOLIC BLOOD PRESSURE: 120 MMHG | OXYGEN SATURATION: 84 % | DIASTOLIC BLOOD PRESSURE: 59 MMHG | TEMPERATURE: 62.1 F

## 2020-05-26 PROCEDURE — 2580000003 HC RX 258: Performed by: NURSE ANESTHETIST, CERTIFIED REGISTERED

## 2020-05-26 PROCEDURE — 6370000000 HC RX 637 (ALT 250 FOR IP): Performed by: ANESTHESIOLOGY

## 2020-05-26 PROCEDURE — 94761 N-INVAS EAR/PLS OXIMETRY MLT: CPT

## 2020-05-26 PROCEDURE — 2580000003 HC RX 258: Performed by: ORTHOPAEDIC SURGERY

## 2020-05-26 PROCEDURE — 2700000000 HC OXYGEN THERAPY PER DAY

## 2020-05-26 PROCEDURE — 6370000000 HC RX 637 (ALT 250 FOR IP): Performed by: INTERNAL MEDICINE

## 2020-05-26 PROCEDURE — 3600000004 HC SURGERY LEVEL 4 BASE: Performed by: ORTHOPAEDIC SURGERY

## 2020-05-26 PROCEDURE — 72170 X-RAY EXAM OF PELVIS: CPT

## 2020-05-26 PROCEDURE — 2500000003 HC RX 250 WO HCPCS: Performed by: INTERNAL MEDICINE

## 2020-05-26 PROCEDURE — 2500000003 HC RX 250 WO HCPCS: Performed by: NURSE ANESTHETIST, CERTIFIED REGISTERED

## 2020-05-26 PROCEDURE — 2580000003 HC RX 258: Performed by: INTERNAL MEDICINE

## 2020-05-26 PROCEDURE — 6370000000 HC RX 637 (ALT 250 FOR IP): Performed by: ORTHOPAEDIC SURGERY

## 2020-05-26 PROCEDURE — 3700000000 HC ANESTHESIA ATTENDED CARE: Performed by: ORTHOPAEDIC SURGERY

## 2020-05-26 PROCEDURE — 6360000002 HC RX W HCPCS: Performed by: ANESTHESIOLOGY

## 2020-05-26 PROCEDURE — 1200000000 HC SEMI PRIVATE

## 2020-05-26 PROCEDURE — 0SRS0JA REPLACEMENT OF LEFT HIP JOINT, FEMORAL SURFACE WITH SYNTHETIC SUBSTITUTE, UNCEMENTED, OPEN APPROACH: ICD-10-PCS | Performed by: ORTHOPAEDIC SURGERY

## 2020-05-26 PROCEDURE — C1776 JOINT DEVICE (IMPLANTABLE): HCPCS | Performed by: ORTHOPAEDIC SURGERY

## 2020-05-26 PROCEDURE — 6360000002 HC RX W HCPCS: Performed by: ORTHOPAEDIC SURGERY

## 2020-05-26 PROCEDURE — 2720000010 HC SURG SUPPLY STERILE: Performed by: ORTHOPAEDIC SURGERY

## 2020-05-26 PROCEDURE — 3700000001 HC ADD 15 MINUTES (ANESTHESIA): Performed by: ORTHOPAEDIC SURGERY

## 2020-05-26 PROCEDURE — 7100000000 HC PACU RECOVERY - FIRST 15 MIN: Performed by: ORTHOPAEDIC SURGERY

## 2020-05-26 PROCEDURE — 2500000003 HC RX 250 WO HCPCS: Performed by: ORTHOPAEDIC SURGERY

## 2020-05-26 PROCEDURE — 3600000014 HC SURGERY LEVEL 4 ADDTL 15MIN: Performed by: ORTHOPAEDIC SURGERY

## 2020-05-26 PROCEDURE — 2709999900 HC NON-CHARGEABLE SUPPLY: Performed by: ORTHOPAEDIC SURGERY

## 2020-05-26 PROCEDURE — 7100000001 HC PACU RECOVERY - ADDTL 15 MIN: Performed by: ORTHOPAEDIC SURGERY

## 2020-05-26 PROCEDURE — 6360000002 HC RX W HCPCS: Performed by: NURSE ANESTHETIST, CERTIFIED REGISTERED

## 2020-05-26 DEVICE — SPACER FEM -3MM OFFSET HIP 12/14 ARTICULEZE TAPR UPLR MOD: Type: IMPLANTABLE DEVICE | Site: HIP | Status: FUNCTIONAL

## 2020-05-26 DEVICE — STEM FEM SZ 12 L130MM NK L38.5MM 135DEG 41MM OFFSET STD HIP: Type: IMPLANTABLE DEVICE | Site: HIP | Status: FUNCTIONAL

## 2020-05-26 DEVICE — SET CBL SL SM DIA2MM VIT FOR RECON AND TRAUM SYS DALL-M: Type: IMPLANTABLE DEVICE | Site: HIP | Status: FUNCTIONAL

## 2020-05-26 DEVICE — HEAD UPLR DIA44MM HIP BALL MOD CATHCART SELF CNTR: Type: IMPLANTABLE DEVICE | Site: HIP | Status: FUNCTIONAL

## 2020-05-26 RX ORDER — TRAMADOL HYDROCHLORIDE 50 MG/1
50 TABLET ORAL EVERY 4 HOURS PRN
Qty: 42 TABLET | Refills: 0 | Status: SHIPPED | OUTPATIENT
Start: 2020-05-26 | End: 2020-06-02

## 2020-05-26 RX ORDER — FENTANYL CITRATE 50 UG/ML
50 INJECTION, SOLUTION INTRAMUSCULAR; INTRAVENOUS EVERY 5 MIN PRN
Status: DISCONTINUED | OUTPATIENT
Start: 2020-05-26 | End: 2020-05-26 | Stop reason: HOSPADM

## 2020-05-26 RX ORDER — SODIUM CHLORIDE 0.9 % (FLUSH) 0.9 %
10 SYRINGE (ML) INJECTION EVERY 12 HOURS SCHEDULED
Status: DISCONTINUED | OUTPATIENT
Start: 2020-05-26 | End: 2020-05-29 | Stop reason: HOSPADM

## 2020-05-26 RX ORDER — SODIUM CHLORIDE 9 MG/ML
INJECTION, SOLUTION INTRAVENOUS CONTINUOUS
Status: DISCONTINUED | OUTPATIENT
Start: 2020-05-26 | End: 2020-05-27

## 2020-05-26 RX ORDER — ACETAMINOPHEN 325 MG/1
650 TABLET ORAL EVERY 6 HOURS PRN
Status: DISCONTINUED | OUTPATIENT
Start: 2020-05-26 | End: 2020-05-26 | Stop reason: SDUPTHER

## 2020-05-26 RX ORDER — OXYCODONE HYDROCHLORIDE AND ACETAMINOPHEN 5; 325 MG/1; MG/1
1 TABLET ORAL
Status: DISCONTINUED | OUTPATIENT
Start: 2020-05-26 | End: 2020-05-26 | Stop reason: HOSPADM

## 2020-05-26 RX ORDER — PROPOFOL 10 MG/ML
INJECTION, EMULSION INTRAVENOUS PRN
Status: DISCONTINUED | OUTPATIENT
Start: 2020-05-26 | End: 2020-05-26 | Stop reason: SDUPTHER

## 2020-05-26 RX ORDER — METOPROLOL SUCCINATE 25 MG/1
25 TABLET, EXTENDED RELEASE ORAL ONCE
Status: COMPLETED | OUTPATIENT
Start: 2020-05-26 | End: 2020-05-26

## 2020-05-26 RX ORDER — ONDANSETRON 2 MG/ML
4 INJECTION INTRAMUSCULAR; INTRAVENOUS
Status: COMPLETED | OUTPATIENT
Start: 2020-05-26 | End: 2020-05-26

## 2020-05-26 RX ORDER — KETAMINE HCL 50MG/ML(1)
SYRINGE (ML) INTRAVENOUS PRN
Status: DISCONTINUED | OUTPATIENT
Start: 2020-05-26 | End: 2020-05-26 | Stop reason: SDUPTHER

## 2020-05-26 RX ORDER — SENNA AND DOCUSATE SODIUM 50; 8.6 MG/1; MG/1
1 TABLET, FILM COATED ORAL 2 TIMES DAILY
Status: DISCONTINUED | OUTPATIENT
Start: 2020-05-26 | End: 2020-05-29 | Stop reason: HOSPADM

## 2020-05-26 RX ORDER — PROPOFOL 10 MG/ML
INJECTION, EMULSION INTRAVENOUS CONTINUOUS PRN
Status: DISCONTINUED | OUTPATIENT
Start: 2020-05-26 | End: 2020-05-26 | Stop reason: SDUPTHER

## 2020-05-26 RX ORDER — FENTANYL CITRATE 50 UG/ML
25 INJECTION, SOLUTION INTRAMUSCULAR; INTRAVENOUS EVERY 5 MIN PRN
Status: DISCONTINUED | OUTPATIENT
Start: 2020-05-26 | End: 2020-05-26 | Stop reason: HOSPADM

## 2020-05-26 RX ORDER — MAGNESIUM HYDROXIDE 1200 MG/15ML
LIQUID ORAL CONTINUOUS PRN
Status: COMPLETED | OUTPATIENT
Start: 2020-05-26 | End: 2020-05-26

## 2020-05-26 RX ORDER — HYDRALAZINE HYDROCHLORIDE 20 MG/ML
5 INJECTION INTRAMUSCULAR; INTRAVENOUS EVERY 10 MIN PRN
Status: DISCONTINUED | OUTPATIENT
Start: 2020-05-26 | End: 2020-05-26 | Stop reason: HOSPADM

## 2020-05-26 RX ORDER — SODIUM CHLORIDE 9 MG/ML
INJECTION, SOLUTION INTRAVENOUS CONTINUOUS PRN
Status: DISCONTINUED | OUTPATIENT
Start: 2020-05-26 | End: 2020-05-26 | Stop reason: SDUPTHER

## 2020-05-26 RX ORDER — LABETALOL 20 MG/4 ML (5 MG/ML) INTRAVENOUS SYRINGE
5 EVERY 10 MIN PRN
Status: DISCONTINUED | OUTPATIENT
Start: 2020-05-26 | End: 2020-05-26 | Stop reason: HOSPADM

## 2020-05-26 RX ORDER — SODIUM CHLORIDE 9 MG/ML
INJECTION, SOLUTION INTRAVENOUS CONTINUOUS
Status: DISCONTINUED | OUTPATIENT
Start: 2020-05-26 | End: 2020-05-26 | Stop reason: SDUPTHER

## 2020-05-26 RX ORDER — SODIUM CHLORIDE 0.9 % (FLUSH) 0.9 %
10 SYRINGE (ML) INJECTION PRN
Status: DISCONTINUED | OUTPATIENT
Start: 2020-05-26 | End: 2020-05-29 | Stop reason: HOSPADM

## 2020-05-26 RX ORDER — PROMETHAZINE HYDROCHLORIDE 25 MG/ML
6.25 INJECTION, SOLUTION INTRAMUSCULAR; INTRAVENOUS
Status: DISCONTINUED | OUTPATIENT
Start: 2020-05-26 | End: 2020-05-26 | Stop reason: HOSPADM

## 2020-05-26 RX ADMIN — METOPROLOL SUCCINATE 25 MG: 25 TABLET, FILM COATED, EXTENDED RELEASE ORAL at 14:00

## 2020-05-26 RX ADMIN — CEFAZOLIN 2 G: 10 INJECTION, POWDER, FOR SOLUTION INTRAVENOUS at 14:00

## 2020-05-26 RX ADMIN — CEFAZOLIN SODIUM 2 G: 10 INJECTION, POWDER, FOR SOLUTION INTRAVENOUS at 22:12

## 2020-05-26 RX ADMIN — HYDRALAZINE HYDROCHLORIDE 5 MG: 20 INJECTION INTRAMUSCULAR; INTRAVENOUS at 16:59

## 2020-05-26 RX ADMIN — AMLODIPINE BESYLATE 5 MG: 5 TABLET ORAL at 18:00

## 2020-05-26 RX ADMIN — LABETALOL 20 MG/4 ML (5 MG/ML) INTRAVENOUS SYRINGE 10 MG: at 00:02

## 2020-05-26 RX ADMIN — PHENYLEPHRINE HYDROCHLORIDE 80 MCG: 10 INJECTION, SOLUTION INTRAMUSCULAR; INTRAVENOUS; SUBCUTANEOUS at 14:47

## 2020-05-26 RX ADMIN — FAMOTIDINE 20 MG: 10 INJECTION, SOLUTION INTRAVENOUS at 14:39

## 2020-05-26 RX ADMIN — DULOXETINE HYDROCHLORIDE 90 MG: 60 CAPSULE, DELAYED RELEASE ORAL at 17:59

## 2020-05-26 RX ADMIN — HYDROCODONE BITARTRATE AND ACETAMINOPHEN 2 TABLET: 5; 325 TABLET ORAL at 03:40

## 2020-05-26 RX ADMIN — ONDANSETRON 4 MG: 2 INJECTION INTRAMUSCULAR; INTRAVENOUS at 14:28

## 2020-05-26 RX ADMIN — SODIUM CHLORIDE: 9 INJECTION, SOLUTION INTRAVENOUS at 14:55

## 2020-05-26 RX ADMIN — TIMOLOL MALEATE 1 DROP: 2.5 SOLUTION OPHTHALMIC at 09:30

## 2020-05-26 RX ADMIN — PROPOFOL 30 MG: 10 INJECTION, EMULSION INTRAVENOUS at 13:48

## 2020-05-26 RX ADMIN — PHENYLEPHRINE HYDROCHLORIDE 80 MCG: 10 INJECTION, SOLUTION INTRAMUSCULAR; INTRAVENOUS; SUBCUTANEOUS at 14:59

## 2020-05-26 RX ADMIN — LABETALOL 20 MG/4 ML (5 MG/ML) INTRAVENOUS SYRINGE 10 MG: at 11:54

## 2020-05-26 RX ADMIN — PROPOFOL 30 MCG/KG/MIN: 10 INJECTION, EMULSION INTRAVENOUS at 13:57

## 2020-05-26 RX ADMIN — Medication 25 MG: at 14:39

## 2020-05-26 RX ADMIN — Medication 10 ML: at 09:30

## 2020-05-26 RX ADMIN — LATANOPROST 1 DROP: 50 SOLUTION OPHTHALMIC at 22:12

## 2020-05-26 RX ADMIN — Medication 5 MG: at 13:48

## 2020-05-26 RX ADMIN — LABETALOL 20 MG/4 ML (5 MG/ML) INTRAVENOUS SYRINGE 10 MG: at 23:19

## 2020-05-26 RX ADMIN — DOCUSATE SODIUM 50 MG AND SENNOSIDES 8.6 MG 1 TABLET: 8.6; 5 TABLET, FILM COATED ORAL at 22:12

## 2020-05-26 RX ADMIN — SODIUM CHLORIDE: 9 INJECTION, SOLUTION INTRAVENOUS at 11:31

## 2020-05-26 RX ADMIN — Medication 10 ML: at 22:19

## 2020-05-26 RX ADMIN — Medication 20 MG: at 13:59

## 2020-05-26 RX ADMIN — TRANEXAMIC ACID 1 G: 1 INJECTION, SOLUTION INTRAVENOUS at 14:00

## 2020-05-26 RX ADMIN — METOPROLOL SUCCINATE 25 MG: 25 TABLET, EXTENDED RELEASE ORAL at 17:59

## 2020-05-26 RX ADMIN — SODIUM CHLORIDE: 9 INJECTION, SOLUTION INTRAVENOUS at 13:37

## 2020-05-26 ASSESSMENT — PULMONARY FUNCTION TESTS
PIF_VALUE: 0
PIF_VALUE: 1
PIF_VALUE: 1
PIF_VALUE: 0
PIF_VALUE: 1
PIF_VALUE: 0
PIF_VALUE: 1
PIF_VALUE: 0
PIF_VALUE: 1
PIF_VALUE: 0
PIF_VALUE: 1
PIF_VALUE: 0
PIF_VALUE: 0
PIF_VALUE: 1
PIF_VALUE: 0
PIF_VALUE: 1
PIF_VALUE: 0
PIF_VALUE: 0
PIF_VALUE: 1
PIF_VALUE: 2
PIF_VALUE: 1
PIF_VALUE: 0
PIF_VALUE: 1
PIF_VALUE: 0
PIF_VALUE: 1
PIF_VALUE: 0
PIF_VALUE: 1
PIF_VALUE: 0
PIF_VALUE: 1
PIF_VALUE: 0
PIF_VALUE: 1
PIF_VALUE: 0
PIF_VALUE: 1
PIF_VALUE: 0
PIF_VALUE: 1
PIF_VALUE: 0
PIF_VALUE: 1
PIF_VALUE: 0
PIF_VALUE: 1
PIF_VALUE: 0
PIF_VALUE: 1
PIF_VALUE: 1
PIF_VALUE: 0
PIF_VALUE: 1
PIF_VALUE: 0
PIF_VALUE: 1
PIF_VALUE: 0
PIF_VALUE: 1
PIF_VALUE: 1
PIF_VALUE: 0
PIF_VALUE: 0
PIF_VALUE: 1
PIF_VALUE: 0
PIF_VALUE: 1
PIF_VALUE: 0
PIF_VALUE: 1
PIF_VALUE: 0
PIF_VALUE: 0

## 2020-05-26 ASSESSMENT — PAIN SCALES - GENERAL
PAINLEVEL_OUTOF10: 3
PAINLEVEL_OUTOF10: 0
PAINLEVEL_OUTOF10: 7
PAINLEVEL_OUTOF10: 0
PAINLEVEL_OUTOF10: 0

## 2020-05-26 ASSESSMENT — PAIN - FUNCTIONAL ASSESSMENT: PAIN_FUNCTIONAL_ASSESSMENT: 0-10

## 2020-05-26 NOTE — ANESTHESIA PRE PROCEDURE
Екатерина Ziegler MD        Or   [facility] acetaminophen (TYLENOL) suppository 650 mg  650 mg Rectal Q6H PRN Jocy Jacques MD        polyethylene glycol (GLYCOLAX) packet 17 g  17 g Oral Daily PRN Jocy Jacques MD        promethazine (PHENERGAN) tablet 12.5 mg  12.5 mg Oral Q6H PRN Jocy Jacques MD        Or    ondansetron [facility]) injection 4 mg  4 mg Intravenous Q6H PRN Jocy aJcques MD        [Held by provider] enoxaparin (LOVENOX) injection 40 mg  40 mg Subcutaneous Daily Jocy Jacques MD   40 mg at 05/25/20 0959    HYDROcodone-acetaminophen (NORCO) 5-325 MG per tablet 1 tablet  1 tablet Oral Q4H PRN Jocy Jacques MD   1 tablet at 05/25/20 1528    Or    HYDROcodone-acetaminophen (NORCO) 5-325 MG per tablet 2 tablet  2 tablet Oral Q4H PRN Jocy Jacques MD   2 tablet at 05/26/20 0340    labetalol (NORMODYNE;TRANDATE) injection syringe 10 mg  10 mg Intravenous Q4H PRN Jocy Jacques MD   10 mg at 05/26/20 1154    melatonin tablet 3 mg  3 mg Oral Nightly PRN Carlos Juarez MD           Allergies:  No Known Allergies    Problem List:    Patient Active Problem List   Diagnosis Code    Altered mental state R41.82    Adjustment disorder with mixed anxiety and depressed mood F43.23    Suicide attempt ([facility] T14.91XA    Closed displaced fracture of left femoral neck ([facility] S72.002A       Past Medical History:        Diagnosis Date    Anxiety     Benign neoplasm of colon     Benign thyroid cyst     Bone/cartilage disorder     Chronic diarrhea     mild    Depression     Enterocolitis     d/t c-diff    Esophagitis     and esophageal ulcerations    GERD (gastroesophageal reflux disease)     H/O Clostridium difficile infection 06/2018    [place] (hard of hearing)     bilateral hearing aids     Hyperlipidemia     Hypertension     Insomnia     Osteoporosis     Pinched nerve in neck 08/2018    recent- causing patient to not to be able to hold fork, drive, play cards per niece     Rotator cuff tendinitis     Sciatica     Skin
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
## 2020-05-26 NOTE — CARE COORDINATION
Case Management Assessment           Initial Evaluation                Date / Time of Evaluation: 5/26/2020 9:24 AM                 Assessment Completed by: Gertrude Sheridan    Patient Name: Briana Almanza     YOB: 1924  Diagnosis: Closed displaced fracture of left femoral neck ([facility] [S72.002A]  Closed displaced fracture of left femoral neck ([facility] [address] Maxime De Oliveiraladarius     Date / Time: 5/24/2020  2:51 PM    Patient Admission Status: Inpatient    If patient is discharged prior to next notation, then this note serves as note for discharge by case management. Current PCP: Martín Lassiter Patient: No    Chart Reviewed: Yes  Patient/ Family Interviewed: Yes    Initial assessment completed at bedside with: Eric Roberts over phone    Hospitalization in the last 30 days: No    Emergency Contacts:  Extended Emergency Contact Information  Primary Emergency Contact: Paco Solis [address] Phone: [phone]  Mobile Phone: [phone]  Relation: Child  Secondary Emergency Contact: Agustin [address] Phone: [phone]  Mobile Phone: [phone]  Relation: Child    Advance Directives:   Code Status: DNR-CC    [address]: Yes  Agent: Shirley Gargmukul  Contact Number: [phone]    Copy present: No     In paper Chart: No    Scanned into EMR No    Financial  Payor: MEDICARE / Plan: MEDICARE PART A AND B / Product Type: *No Product type* /     Pre-cert required for SNF: Yes    Pharmacy  No Pharmacies Listed    Potential assistance Purchasing Medications: Potential Assistance Purchasing Medications: No  Does Patient want to participate in local refill/ meds to beds program?: Not Assessed    Meds To Beds General Rules:  1. Can ONLY be done Monday- Friday between 8:30am-5pm  2. Prescription(s) must be in pharmacy by 3pm to be filled same day  3. Copy of patient's insurance/ prescription drug card and patient face sheet must be sent along with the Management Department  Ph: [phone]   Fax: [phone]

## 2020-05-26 NOTE — PROGRESS NOTES
Sent to  [facility] from floor for hold for OR consent clarified with son Mar and witnessed by 2 nurses per phone. One ring removed from right sm.  Finger taken back to room signed for by Esther Humphrey RN

## 2020-05-26 NOTE — OP NOTE
correct patient, operative site and operative plan. A posterior skin incision was made centered over the posteior tip of the greater trochanter in line with the femur. The subcutaneous tissue was dissected with electrocautery down to the fascia alejandro and iliotibial band. The iliotibial band and fascia alejandro was divided in line with its fibers followed by Charnley retraction. The hip was placed in midflexion and maximal internal rotation. The short external rotators and capsule were taken down in a continuous layer and tagged for later repair. The femur was internally rotated. A femoral neck cut was made just distal to the fracture with a reciprocating saw. A corkscrew was utilized to remove the femoral head from the acetabulum. All lateral cortical bone was removed with a rongeur and troch violette to prevent varus alignment, followed by introduction of a femoral canal finder. The femur was then sequentially broached to a size 12 stem which seated to the level of the neck cut with metaphyseal fill. I noted a small crack in medial calcar limiting rotational stability. I opened and placed a single cable to prevent propagation of the fracture line and the broach was replaced with good rotational stability. The broach was removed and attention was turned to the acetabulum. The femoral head which had been removed measured to be 44 mm in diamter. A variety of head sizes were trialed in the acetabulum. A 44 mm diameter head was felt to be re-create the patient's anatomy and produced excellent suction fit within the acetabulum. Attention was turned back to the femur. The stem was inserted and seated to the level of the neck cut. A variety of heads were trialed and the size 44 minus best re-created the patient's leg length in comparison to the down leg with appropriate soft tissue tension and offset.  The hip was stable in 90 degrees of flexion with 70 degrees of internal rotation as well as in extension and maximum external rotation. The actual femoral head and assembled on the back table and then impacted into placed onto a dry neck taper. The wound was then copiously irrigated with 1 L of sterile saline with bacitracin with pulsatile irrigation. The capsule and short external rotators were repaired to the posterior aspect of the gluteus medius tendon with previously placed tag sutures via a free needle. The fascia and Iliotibial band was then closed with a running #2 quill suture followed by an 0 quill suture in the subcutaneous tissue. 2-0 vicryl was placed in the subdermal tissue followed by skin staples for definitive closure. A sterile silver impregnated occlusive dressing was applied over the incision. The patient was then transferred to their hospital bed and transported to the post-operative anesthesia care unit in stable condition. All sponge and needle counts were correct x 2.

## 2020-05-26 NOTE — PLAN OF CARE
Problem: Falls - Risk of:  Goal: Will remain free from falls  Description: Will remain free from falls  5/26/2020 1006 by Luisito Ryan RN  Outcome: Ongoing  5/25/2020 2314 by Alfred Buck RN  Outcome: Ongoing     Problem: Pain:  Goal: Pain level will decrease  Description: Pain level will decrease  5/26/2020 1006 by Luisito Ryan RN  Outcome: Ongoing  5/25/2020 2314 by Alfred Buck RN  Outcome: Ongoing

## 2020-05-26 NOTE — PROGRESS NOTES
Admitted to pacu at this time. Received spinal anesthesia with no movement or sensation in lower extremities. Difficult to  sao2. Trying on earlobe. Other VSS.  Report given by CRNA

## 2020-05-26 NOTE — PROGRESS NOTES
Occupational Therapy/Physical Therapy  Therapy evaluation orders received. Pt is on bed rest and may have L hip sx today pending family's decision for surgical intervention. Will follow up later today vs 5/27. Discussed with RN.     Tiago Mittal [address] [place] [address], [address] [place] [address], DPT  [id number]

## 2020-05-27 LAB
BACTERIA: ABNORMAL /HPF
BILIRUBIN URINE: ABNORMAL
BLOOD, URINE: ABNORMAL
CLARITY: CLEAR
COLOR: YELLOW
EPITHELIAL CELLS, UA: ABNORMAL /HPF (ref 0–5)
GLUCOSE URINE: NEGATIVE MG/DL
HCT VFR BLD CALC: 39 % (ref 36–48)
HEMOGLOBIN: 12.9 G/DL (ref 12–16)
KETONES, URINE: 40 MG/DL
LEUKOCYTE ESTERASE, URINE: NEGATIVE
MICROSCOPIC EXAMINATION: YES
NITRITE, URINE: NEGATIVE
PH UA: 6.5 (ref 5–8)
PROTEIN UA: 30 MG/DL
RBC UA: ABNORMAL /HPF (ref 0–4)
SPECIFIC GRAVITY UA: 1.02 (ref 1–1.03)
URINE TYPE: ABNORMAL
UROBILINOGEN, URINE: 4 E.U./DL
WBC UA: ABNORMAL /HPF (ref 0–5)

## 2020-05-27 PROCEDURE — 94761 N-INVAS EAR/PLS OXIMETRY MLT: CPT

## 2020-05-27 PROCEDURE — 85018 HEMOGLOBIN: CPT

## 2020-05-27 PROCEDURE — 85014 HEMATOCRIT: CPT

## 2020-05-27 PROCEDURE — 97530 THERAPEUTIC ACTIVITIES: CPT

## 2020-05-27 PROCEDURE — 36415 COLL VENOUS BLD VENIPUNCTURE: CPT

## 2020-05-27 PROCEDURE — 2700000000 HC OXYGEN THERAPY PER DAY

## 2020-05-27 PROCEDURE — 81001 URINALYSIS AUTO W/SCOPE: CPT

## 2020-05-27 PROCEDURE — 2580000003 HC RX 258: Performed by: ORTHOPAEDIC SURGERY

## 2020-05-27 PROCEDURE — 94150 VITAL CAPACITY TEST: CPT

## 2020-05-27 PROCEDURE — 2580000003 HC RX 258: Performed by: INTERNAL MEDICINE

## 2020-05-27 PROCEDURE — 6360000002 HC RX W HCPCS: Performed by: ORTHOPAEDIC SURGERY

## 2020-05-27 PROCEDURE — 6370000000 HC RX 637 (ALT 250 FOR IP): Performed by: ORTHOPAEDIC SURGERY

## 2020-05-27 PROCEDURE — 6370000000 HC RX 637 (ALT 250 FOR IP): Performed by: INTERNAL MEDICINE

## 2020-05-27 PROCEDURE — 51798 US URINE CAPACITY MEASURE: CPT

## 2020-05-27 PROCEDURE — 1200000000 HC SEMI PRIVATE

## 2020-05-27 PROCEDURE — 97162 PT EVAL MOD COMPLEX 30 MIN: CPT

## 2020-05-27 PROCEDURE — 97535 SELF CARE MNGMENT TRAINING: CPT

## 2020-05-27 PROCEDURE — 97166 OT EVAL MOD COMPLEX 45 MIN: CPT

## 2020-05-27 RX ORDER — 0.9 % SODIUM CHLORIDE 0.9 %
250 INTRAVENOUS SOLUTION INTRAVENOUS ONCE
Status: COMPLETED | OUTPATIENT
Start: 2020-05-27 | End: 2020-05-27

## 2020-05-27 RX ADMIN — Medication 10 ML: at 10:37

## 2020-05-27 RX ADMIN — DOCUSATE SODIUM 50 MG AND SENNOSIDES 8.6 MG 1 TABLET: 8.6; 5 TABLET, FILM COATED ORAL at 10:35

## 2020-05-27 RX ADMIN — DULOXETINE HYDROCHLORIDE 90 MG: 60 CAPSULE, DELAYED RELEASE ORAL at 10:35

## 2020-05-27 RX ADMIN — LATANOPROST 1 DROP: 50 SOLUTION OPHTHALMIC at 20:16

## 2020-05-27 RX ADMIN — APIXABAN 2.5 MG: 2.5 TABLET, FILM COATED ORAL at 20:15

## 2020-05-27 RX ADMIN — SODIUM CHLORIDE: 9 INJECTION, SOLUTION INTRAVENOUS at 05:24

## 2020-05-27 RX ADMIN — AMLODIPINE BESYLATE 5 MG: 5 TABLET ORAL at 10:35

## 2020-05-27 RX ADMIN — SODIUM CHLORIDE 250 ML: 9 INJECTION, SOLUTION INTRAVENOUS at 15:44

## 2020-05-27 RX ADMIN — PANTOPRAZOLE SODIUM 20 MG: 20 TABLET, DELAYED RELEASE ORAL at 05:39

## 2020-05-27 RX ADMIN — METOPROLOL SUCCINATE 25 MG: 25 TABLET, EXTENDED RELEASE ORAL at 10:36

## 2020-05-27 RX ADMIN — APIXABAN 2.5 MG: 2.5 TABLET, FILM COATED ORAL at 10:36

## 2020-05-27 RX ADMIN — HYDROCODONE BITARTRATE AND ACETAMINOPHEN 1 TABLET: 5; 325 TABLET ORAL at 05:39

## 2020-05-27 RX ADMIN — TIMOLOL MALEATE 1 DROP: 2.5 SOLUTION OPHTHALMIC at 10:41

## 2020-05-27 RX ADMIN — ACETAMINOPHEN 650 MG: 325 TABLET ORAL at 22:46

## 2020-05-27 RX ADMIN — ACETAMINOPHEN 650 MG: 325 TABLET ORAL at 15:57

## 2020-05-27 RX ADMIN — DOCUSATE SODIUM 50 MG AND SENNOSIDES 8.6 MG 1 TABLET: 8.6; 5 TABLET, FILM COATED ORAL at 20:15

## 2020-05-27 RX ADMIN — CEFAZOLIN SODIUM 2 G: 10 INJECTION, POWDER, FOR SOLUTION INTRAVENOUS at 05:39

## 2020-05-27 RX ADMIN — SODIUM CHLORIDE: 9 INJECTION, SOLUTION INTRAVENOUS at 07:57

## 2020-05-27 ASSESSMENT — PAIN SCALES - PAIN ASSESSMENT IN ADVANCED DEMENTIA (PAINAD)
FACIALEXPRESSION: 0
NEGVOCALIZATION: 1
TOTALSCORE: 3
BREATHING: 1
CONSOLABILITY: 0
BODYLANGUAGE: 1

## 2020-05-27 ASSESSMENT — PAIN SCALES - GENERAL
PAINLEVEL_OUTOF10: 0
PAINLEVEL_OUTOF10: 3
PAINLEVEL_OUTOF10: 5
PAINLEVEL_OUTOF10: 3

## 2020-05-27 ASSESSMENT — PAIN - FUNCTIONAL ASSESSMENT
PAIN_FUNCTIONAL_ASSESSMENT: PREVENTS OR INTERFERES SOME ACTIVE ACTIVITIES AND ADLS
PAIN_FUNCTIONAL_ASSESSMENT: PREVENTS OR INTERFERES SOME ACTIVE ACTIVITIES AND ADLS

## 2020-05-27 ASSESSMENT — PAIN DESCRIPTION - PAIN TYPE
TYPE: SURGICAL PAIN
TYPE: SURGICAL PAIN;ACUTE PAIN
TYPE: ACUTE PAIN

## 2020-05-27 ASSESSMENT — PAIN DESCRIPTION - ONSET
ONSET: ON-GOING
ONSET: ON-GOING

## 2020-05-27 ASSESSMENT — PAIN DESCRIPTION - FREQUENCY
FREQUENCY: CONTINUOUS
FREQUENCY: CONTINUOUS

## 2020-05-27 ASSESSMENT — PAIN DESCRIPTION - ORIENTATION
ORIENTATION: LEFT

## 2020-05-27 ASSESSMENT — PAIN DESCRIPTION - DESCRIPTORS
DESCRIPTORS: ACHING
DESCRIPTORS: ACHING

## 2020-05-27 ASSESSMENT — PAIN DESCRIPTION - LOCATION
LOCATION: HIP
LOCATION: HIP
LOCATION: HIP;BACK

## 2020-05-27 ASSESSMENT — PAIN DESCRIPTION - PROGRESSION
CLINICAL_PROGRESSION: NOT CHANGED
CLINICAL_PROGRESSION: GRADUALLY WORSENING

## 2020-05-27 NOTE — PROGRESS NOTES
Pt ate well for lunch and dinner, voided a large amount after. VSS, on oxygen 2L nasal cannula. Turning patient frequently with purple wedge, on special mattress. Denies nausea/vomiting. Was up to the chair today x2 maxi move.

## 2020-05-27 NOTE — PROGRESS NOTES
Physical Therapy    Facility/Department: [facility]  Initial Assessment/discharge note      NAME: Ifrah Suarez  : 1924  MRN: [id number]    Date of Service: 2020    Discharge Recommendations:Kelly Ratliff scored a / on the AM-PAC short mobility form. Current research shows that an AM-PAC score of 18 or greater is typically associated with a discharge to the patient's home setting. Based on the patient's AM-PAC score and their current functional mobility deficits, it is recommended that the patient have 2-3 sessions per week of Physical Therapy at d/c to increase the patient's independence. At this time, this patient demonstrates the endurance and safety to discharge home with home  Services for caregiver training and a follow up treatment frequency of 2-3x/wk. Please see assessment section for further patient specific details. PT Equipment Recommendations  Equipment Needed: No(no new needs)    Assessment   Assessment: 81 yo admitted for fall/hip arthroplasty. Per pt report, she is assist of 2 via otis lift to w/c at baseline and she plans to return to the [place]. No acute PT needs as she is still at her baseline. Pt may benefit from home PT consult for caregiver training for hip precautions/ther ex. Will sign off and recommend nursing assist pt up to chair PRN and often via maximover. Treatment Diagnosis: L hip fx  Decision Making: Medium Complexity  Patient Education: Pt educated on PT role, importance of OOB mobility, posterior hip precautions, WBAT L and she verbalized partial understanding but will need reinforcement. REQUIRES PT FOLLOW UP: No  Activity Tolerance  Activity Tolerance: Patient Tolerated treatment well;Patient limited by pain; Patient limited by cognitive status  Activity Tolerance: Pt confused at times this am (nurse reports some due to meds).         Patient Diagnosis(es): The primary encounter diagnosis was Left displaced femoral neck fracture [address] Help From: Personal care attendant  ADL Assistance: (total assist for dressing/bathing/eating)  Homemaking Assistance: (pt does not perform)  Ambulation Assistance: (non ambulatory)  Transfer Assistance: (mechanical lift to chair)  Active : No  Occupation: Retired  Additional Comments: Pt reports she is total care with otis lift at [employer] (has personal aids) and that she has been non ambulatory for at least 3 years.    Objective          AROM RLE (degrees)  RLE AROM: hip/knee flexion to approx 90 degrees; ankle DF to neutral  AROM LLE (degrees)  LLE AROM : Hip flexion to approx 90 degrees; knee flexion to 45 degrees; ankle DF to neutral    Strength RLE  Strength RLE: grossly 2/5  Strength LLE  Strength LLE: grossly 1/5-limited by pain      Bed mobility: dependent for rolling R (x2) and L with hip abductor pillow in place     Transfers  Sit to Stand: (unable-otis lift at baseline)  Bed to Chair: Dependent/Total(assist x2 via maximover)        Balance  Comments: did not assess due to pt maximover for txfers at baseline        Plan   Safety Devices  Type of devices: Nurse notified, Left in chair, Chair alarm in place, Call light within reach(pt reclined)                          AM-PAC Score  AM-PAC Inpatient Mobility Raw Score : 6 (05/27/20 0931)  AM-PAC Inpatient T-Scale Score : 23.55 (05/27/20 0931)  Mobility Inpatient CMS 0-100% Score: 100 (05/27/20 0931)  Mobility Inpatient CMS G-Code Modifier : CN (05/27/20 0931)            Therapy Time   Individual Concurrent Group Co-treatment   Time In 0800         Time Out 0840         Minutes 40           Timed Code Treatment Minutes:30       Total Treatment Minutes:  [facility],

## 2020-05-27 NOTE — CARE COORDINATION
Once medically ready pt will DC back to the [address] [place] [address].  Electronically signed by Regan Hutchins RN on 5/27/2020 at 12:32 PM

## 2020-05-27 NOTE — PLAN OF CARE
Problem: Falls - Risk of:  Goal: Will remain free from falls  Description: Will remain free from falls  5/27/2020 0006 by Karmen Ponce RN  Outcome: Ongoing     Problem: Pain:  Goal: Pain level will decrease  Description: Pain level will decrease  5/27/2020 0006 by Karmen Ponce RN  Outcome: Ongoing
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
## 2020-05-27 NOTE — PROGRESS NOTES
Occupational Therapy   Occupational Therapy Initial Assessment and Treatment  Discharge  Date: 2020   Patient Name: Obie Rubio  MRN: [id number]     : 1924    Date of Service: 2020    Discharge Recommendations:  Obie Rubio scored a 9/24 on the AM-PAC ADL Inpatient form. At this time, no further OT is recommended upon discharge as pt requires full assist w/ bathing, dressing, and meal setup at baseline. Recommend patient returns to prior setting with prior services. OT Equipment Recommendations  Equipment Needed: No    Assessment   Assessment: Pt admitted from LTC s/p falling OOB. At baseline, pt requires full assist w/ bathing, dressing, and toileting. Pt requires otis lift for OOB. Pt is not appropriate for skilled OT - as pt requires full care w/ ADLs at baseline; otis at baseline. Defer OOB to chair to nursing, via otis lift. Will sign off. Decision Making: Medium Complexity  OT Education: OT Role;Plan of Care  REQUIRES OT FOLLOW UP: No  Activity Tolerance  Activity Tolerance: Patient Tolerated treatment well  Safety Devices  Safety Devices in place: Yes  Type of devices: Nurse notified; Left in chair;Chair alarm in place;Call light within reach(setup w/ meal, pillows positioned for trunk support)           Patient Diagnosis(es): The primary encounter diagnosis was Left displaced femoral neck fracture ([facility] [address] A diagnosis of Acute on chronic congestive heart failure, unspecified heart failure type [facility]) was also pertinent to this visit.      has a past medical history of Anxiety, Benign neoplasm of colon, Benign thyroid cyst, Bone/cartilage disorder, Chronic diarrhea, Depression, Enterocolitis, Esophagitis, GERD (gastroesophageal reflux disease), H/O Clostridium difficile infection, [place] (hard of hearing), Hyperlipidemia, Hypertension, Insomnia, Osteoporosis, Pinched nerve in neck, Rotator cuff tendinitis, Sciatica, Skin cancer, Stenosis, spinal, lumbar, TIA (transient assistance(assist for cutting food, setup; able to feed self w/ finger foods; unable to hold onto cup; discussed w/ nurse recommendation for finger foods and cups w/ lids)  LE Dressing: Minimal assistance  Toileting: Dependent/Total  Additional Comments: Note: at baseline, pt requires full assist w/ bathing, dressing; also requires assist w/ food setup ; NOTE: pt has memory deficits and require reminders for hip precautions    Coordination  Movements Are Fluid And Coordinated: No  Coordination and Movement description: Fine motor impairments;Gross motor impairments;Decreased accuracy; Decreased speed        Bed mobility  Rolling to Left: Dependent/Total  Rolling to Right: Dependent/Total  Comment: pt incontinent of bowel and bladder - incontinence care provided, clean depends donned and Purewick catheter applied; bed to chair via otis lift (as she uses otis at baseline)           Cognition  Cognition Comment: pleasant, cooperative, follows directions w/ repetition; forgetful at times                                    Pt seen by OT for eval and treat. Treatment included: bed mobility, ADL, transfer         Plan   Discharge acute OT.                                                       AM-PAC Score        AM-[facility] Inpatient Daily Activity Raw Score: 9 (05/27/20 0934)  AM-PAC Inpatient ADL T-Scale Score : 25.33 (05/27/20 0934)  ADL Inpatient CMS 0-100% Score: 79.59 (05/27/20 0934)  ADL Inpatient CMS G-Code Modifier : CL (05/27/20 9342)              Therapy Time   Individual Concurrent Group Co-treatment   Time In 0800         Time Out 0840         Minutes 40           Timed Code Treatment Minutes:   25    Total Treatment Minutes:  [place] OTR/L #[id number]

## 2020-05-28 LAB
HCT VFR BLD CALC: 37.2 % (ref 36–48)
HEMOGLOBIN: 12.1 G/DL (ref 12–16)

## 2020-05-28 PROCEDURE — 6370000000 HC RX 637 (ALT 250 FOR IP): Performed by: INTERNAL MEDICINE

## 2020-05-28 PROCEDURE — 1200000000 HC SEMI PRIVATE

## 2020-05-28 PROCEDURE — 85018 HEMOGLOBIN: CPT

## 2020-05-28 PROCEDURE — 94761 N-INVAS EAR/PLS OXIMETRY MLT: CPT

## 2020-05-28 PROCEDURE — 2580000003 HC RX 258: Performed by: ORTHOPAEDIC SURGERY

## 2020-05-28 PROCEDURE — 85014 HEMATOCRIT: CPT

## 2020-05-28 PROCEDURE — 94150 VITAL CAPACITY TEST: CPT

## 2020-05-28 PROCEDURE — 2700000000 HC OXYGEN THERAPY PER DAY

## 2020-05-28 PROCEDURE — 6370000000 HC RX 637 (ALT 250 FOR IP): Performed by: ORTHOPAEDIC SURGERY

## 2020-05-28 PROCEDURE — 36415 COLL VENOUS BLD VENIPUNCTURE: CPT

## 2020-05-28 RX ORDER — AMLODIPINE BESYLATE 10 MG/1
10 TABLET ORAL DAILY
Status: DISCONTINUED | OUTPATIENT
Start: 2020-05-29 | End: 2020-05-29 | Stop reason: HOSPADM

## 2020-05-28 RX ORDER — AMLODIPINE BESYLATE 5 MG/1
5 TABLET ORAL DAILY
Status: DISCONTINUED | OUTPATIENT
Start: 2020-05-28 | End: 2020-05-28 | Stop reason: DRUGHIGH

## 2020-05-28 RX ORDER — AMLODIPINE BESYLATE 10 MG/1
10 TABLET ORAL ONCE
Status: COMPLETED | OUTPATIENT
Start: 2020-05-28 | End: 2020-05-28

## 2020-05-28 RX ORDER — LISINOPRIL 10 MG/1
5 TABLET ORAL DAILY
Status: DISCONTINUED | OUTPATIENT
Start: 2020-05-28 | End: 2020-05-28

## 2020-05-28 RX ADMIN — Medication 10 ML: at 09:14

## 2020-05-28 RX ADMIN — Medication 10 ML: at 20:34

## 2020-05-28 RX ADMIN — HYDROCODONE BITARTRATE AND ACETAMINOPHEN 1 TABLET: 5; 325 TABLET ORAL at 20:56

## 2020-05-28 RX ADMIN — AMLODIPINE BESYLATE 10 MG: 5 TABLET ORAL at 09:14

## 2020-05-28 RX ADMIN — DULOXETINE HYDROCHLORIDE 90 MG: 60 CAPSULE, DELAYED RELEASE ORAL at 09:13

## 2020-05-28 RX ADMIN — TIMOLOL MALEATE 1 DROP: 2.5 SOLUTION OPHTHALMIC at 09:16

## 2020-05-28 RX ADMIN — ACETAMINOPHEN 650 MG: 325 TABLET ORAL at 19:55

## 2020-05-28 RX ADMIN — ACETAMINOPHEN 650 MG: 325 TABLET ORAL at 09:17

## 2020-05-28 RX ADMIN — APIXABAN 2.5 MG: 2.5 TABLET, FILM COATED ORAL at 09:14

## 2020-05-28 RX ADMIN — METOPROLOL SUCCINATE 25 MG: 25 TABLET, EXTENDED RELEASE ORAL at 09:19

## 2020-05-28 RX ADMIN — DOCUSATE SODIUM 50 MG AND SENNOSIDES 8.6 MG 1 TABLET: 8.6; 5 TABLET, FILM COATED ORAL at 09:14

## 2020-05-28 RX ADMIN — PANTOPRAZOLE SODIUM 20 MG: 20 TABLET, DELAYED RELEASE ORAL at 06:17

## 2020-05-28 RX ADMIN — APIXABAN 2.5 MG: 2.5 TABLET, FILM COATED ORAL at 20:32

## 2020-05-28 ASSESSMENT — PAIN SCALES - GENERAL
PAINLEVEL_OUTOF10: 0
PAINLEVEL_OUTOF10: 3
PAINLEVEL_OUTOF10: 0
PAINLEVEL_OUTOF10: 3
PAINLEVEL_OUTOF10: 6
PAINLEVEL_OUTOF10: 6

## 2020-05-28 ASSESSMENT — PAIN DESCRIPTION - FREQUENCY
FREQUENCY: CONTINUOUS

## 2020-05-28 ASSESSMENT — PAIN DESCRIPTION - ORIENTATION
ORIENTATION: LEFT

## 2020-05-28 ASSESSMENT — PAIN - FUNCTIONAL ASSESSMENT
PAIN_FUNCTIONAL_ASSESSMENT: PREVENTS OR INTERFERES SOME ACTIVE ACTIVITIES AND ADLS

## 2020-05-28 ASSESSMENT — PAIN DESCRIPTION - PAIN TYPE
TYPE: SURGICAL PAIN

## 2020-05-28 ASSESSMENT — PAIN DESCRIPTION - PROGRESSION
CLINICAL_PROGRESSION: NOT CHANGED

## 2020-05-28 ASSESSMENT — PAIN DESCRIPTION - DESCRIPTORS
DESCRIPTORS: ACHING

## 2020-05-28 ASSESSMENT — PAIN DESCRIPTION - ONSET
ONSET: ON-GOING

## 2020-05-28 ASSESSMENT — PAIN DESCRIPTION - LOCATION
LOCATION: HIP

## 2020-05-28 NOTE — PROGRESS NOTES
Vacuum dressing changed to prevena plus, staples are intact, skin area cleaned with CHG swab. New prevena plus in place with good seal, pt tolerated dressing change well.

## 2020-05-28 NOTE — CARE COORDINATION
CM attempted to call the [facility] multiple times today to see if they could take pt back with oxygen since pt is LTC. CM unable to get ahold of anyone to leave a message. Will try again later. Electronically signed by Shanell Henriquez RN on 5/28/2020 at 2:57 PM      Pt to return to [facility] when medically ready.  Electronically signed by Shanell Henriquez RN on 5/28/2020 at 4:04 PM

## 2020-05-28 NOTE — PLAN OF CARE
Problem: Falls - Risk of:  Goal: Will remain free from falls  Description: Fall precautions in place. Bed is in lowest position, wheels locked, alarm on, non-skid socks on. Call light and bedside table within reach. Patient calls out appropriately. Patient is up x2 max assist with maximove. Will continue to assess and monitor. 5/28/2020 0753 by Carlos Alberto Londono RN  Outcome: Ongoing       Problem: Pain:  Goal: Pain level will decrease  Description: Pain level will decrease. Pt requests tylenol PRN.   5/28/2020 0753 by Carlos Alberto Londono RN  Outcome: Ongoing

## 2020-05-28 NOTE — PROGRESS NOTES
No acute events over night. Pt confused and hallucinating at times. [employer] camera on for safety. Pt did not attempt to get OOB. No continued draining from the wound vac. Pt moves BLE to command. Pt voiding adequately per external catheter and brief. Bed alarm on, wheels locked, bed in lowest position, side rails up 2/4, nonskid socks on, call light and bedside table in reach. Will continue to monitor.

## 2020-05-29 VITALS
TEMPERATURE: 97.9 F | RESPIRATION RATE: 16 BRPM | WEIGHT: 119.49 LBS | OXYGEN SATURATION: 90 % | HEART RATE: 78 BPM | HEIGHT: 63 IN | DIASTOLIC BLOOD PRESSURE: 69 MMHG | BODY MASS INDEX: 21.17 KG/M2 | SYSTOLIC BLOOD PRESSURE: 134 MMHG

## 2020-05-29 PROCEDURE — 2580000003 HC RX 258: Performed by: INTERNAL MEDICINE

## 2020-05-29 PROCEDURE — 6370000000 HC RX 637 (ALT 250 FOR IP): Performed by: INTERNAL MEDICINE

## 2020-05-29 PROCEDURE — 94761 N-INVAS EAR/PLS OXIMETRY MLT: CPT

## 2020-05-29 PROCEDURE — 6370000000 HC RX 637 (ALT 250 FOR IP): Performed by: ORTHOPAEDIC SURGERY

## 2020-05-29 PROCEDURE — 2700000000 HC OXYGEN THERAPY PER DAY

## 2020-05-29 PROCEDURE — 2580000003 HC RX 258: Performed by: ORTHOPAEDIC SURGERY

## 2020-05-29 RX ORDER — AMLODIPINE BESYLATE 10 MG/1
10 TABLET ORAL DAILY
Qty: 30 TABLET | Refills: 0 | Status: SHIPPED | OUTPATIENT
Start: 2020-05-29

## 2020-05-29 RX ADMIN — METOPROLOL SUCCINATE 25 MG: 25 TABLET, EXTENDED RELEASE ORAL at 08:41

## 2020-05-29 RX ADMIN — DULOXETINE HYDROCHLORIDE 90 MG: 60 CAPSULE, DELAYED RELEASE ORAL at 08:41

## 2020-05-29 RX ADMIN — HYDROCODONE BITARTRATE AND ACETAMINOPHEN 1 TABLET: 5; 325 TABLET ORAL at 03:15

## 2020-05-29 RX ADMIN — PANTOPRAZOLE SODIUM 20 MG: 20 TABLET, DELAYED RELEASE ORAL at 06:32

## 2020-05-29 RX ADMIN — HYDROCODONE BITARTRATE AND ACETAMINOPHEN 1 TABLET: 5; 325 TABLET ORAL at 09:32

## 2020-05-29 RX ADMIN — ACETAMINOPHEN 650 MG: 325 TABLET ORAL at 02:59

## 2020-05-29 RX ADMIN — AMLODIPINE BESYLATE 10 MG: 10 TABLET ORAL at 08:41

## 2020-05-29 RX ADMIN — Medication 10 ML: at 09:00

## 2020-05-29 RX ADMIN — APIXABAN 2.5 MG: 2.5 TABLET, FILM COATED ORAL at 08:41

## 2020-05-29 RX ADMIN — TIMOLOL MALEATE 1 DROP: 2.5 SOLUTION OPHTHALMIC at 08:41

## 2020-05-29 RX ADMIN — Medication 10 ML: at 08:41

## 2020-05-29 ASSESSMENT — PAIN DESCRIPTION - LOCATION
LOCATION: HIP

## 2020-05-29 ASSESSMENT — PAIN DESCRIPTION - FREQUENCY
FREQUENCY: CONTINUOUS

## 2020-05-29 ASSESSMENT — PAIN DESCRIPTION - DESCRIPTORS
DESCRIPTORS: ACHING

## 2020-05-29 ASSESSMENT — PAIN SCALES - GENERAL
PAINLEVEL_OUTOF10: 6
PAINLEVEL_OUTOF10: 6
PAINLEVEL_OUTOF10: 0
PAINLEVEL_OUTOF10: 0
PAINLEVEL_OUTOF10: 3

## 2020-05-29 ASSESSMENT — PAIN - FUNCTIONAL ASSESSMENT
PAIN_FUNCTIONAL_ASSESSMENT: PREVENTS OR INTERFERES SOME ACTIVE ACTIVITIES AND ADLS

## 2020-05-29 ASSESSMENT — PAIN DESCRIPTION - PAIN TYPE
TYPE: SURGICAL PAIN
TYPE: ACUTE PAIN
TYPE: SURGICAL PAIN

## 2020-05-29 ASSESSMENT — PAIN DESCRIPTION - ORIENTATION
ORIENTATION: LEFT

## 2020-05-29 ASSESSMENT — PAIN DESCRIPTION - ONSET
ONSET: ON-GOING

## 2020-05-29 ASSESSMENT — PAIN DESCRIPTION - PROGRESSION
CLINICAL_PROGRESSION: NOT CHANGED

## 2020-05-29 NOTE — CARE COORDINATION
discharge: [address] Completed: Yes    Notification completed in HENS/PAS?:  Not Applicable    IMM Completed:   Yes, Case management has presented and reviewed IMM letter #2 to the patient and/or family/ POA. Patient and/or family/POA verbalized understanding of their medicare rights and appeal process if needed. Patient and/or family/POA has signed, initialed and placed today's date (5/29/20) and time ([phone]) on IMM letter #2 on the the appropriate lines. Patient and/or family/POA, copy of letter offered and they are aware that this original copy of IMM letter #2 is available prior to discharge from the paper chart on the unit. Electronic documentation has been entered into epic for IMM letter #2 and original paper copy has been added to the paper chart at the nurses station. Transportation:  Transportation PLAN for discharge: EMS transportation   Mode of Transport: Ambulance stretcher - BLS  Reason for medical transport: Flight Risk due to L femur neck fx  Name of [address]: [address]  Phone: [phone]  Time of Transport: 6pm     Transport form completed: Yes        Referrals made at [facility] for outpatient continued care:  Not Applicable    Additional CM Notes: CM spoke with son Kimberley Miller over phone and updated of DC and got a verbal IMM letter signature. Pt okay for DC via transport back to the [address] [place] [address]. The Plan for Transition of Care is related to the following treatment goals of Closed displaced fracture of left femoral neck (HCC) [S72.002A]  Closed displaced fracture of left femoral neck ([address] [S72.002A]    The Patient and/or patient representative Glynn Deras and her family were provided with a choice of provider and agrees with the discharge plan Yes    Freedom of choice list was provided with basic dialogue that supports the patient's individualized plan of care/goals and shares the quality data associated with the providers.  Yes    Care Transitions patient: No    Brian Sue,
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
## 2020-05-29 NOTE — DISCHARGE INSTR - COC
Continuity of Care Form    Patient Name: Henny Kemp   :  1924  MRN:  [id number]    Admit date:  2020  Discharge date:  2020    Code Status Order: Full Code   Advance Directives:   [facility] Documentation     Date/Time Healthcare Directive Type of Healthcare Directive Copy in [address] Agent's Name Healthcare Agent's Phone Number    20 [phone]  --  --  Yes, copy in chart  --  --  --    20 3310  Yes, patient has an advance directive for healthcare treatment  Durable power of  for health care  No, copy requested from other (See comment) need to request from [place] [phone]  --          Admitting Physician:  Vandana Renteria MD  PCP: Nam Byers    Discharging Nurse: [facility] Unit/Room#: 2667/1351-26  Discharging Unit Phone Number: [phone]    Emergency Contact:   Extended Emergency Contact Information  Primary Emergency Contact: Fior Alberto[address] Phone: [phone]  Mobile Phone: [phone]  Relation: Child  Secondary Emergency Contact: Martin Shakeel [address] Phone: [phone]  Mobile Phone: [phone]  Relation: Child    Past Surgical History:  Past Surgical History:   Procedure Laterality Date    BACK SURGERY      CATARACT REMOVAL      right    HIP SURGERY Left 2020    LEFT HIP HEMIARTHROPLASTY performed by Main Robbins MD at [facility] 1      bilateral knee replacements       Immunization History: There is no immunization history on file for this patient.     Active Problems:  Patient Active Problem List   Diagnosis Code    Altered mental state R41.82    Adjustment disorder with mixed anxiety and depressed mood F43.23    Suicide attempt ([facility] [address] T14.91XA    Closed displaced fracture of left femoral neck ([facility] [address] S72.002A       Isolation/Infection:   Isolation          No Isolation        Patient Infection Status     Infection Onset Added Last Indicated Last Indicated By Review Planned Expiration Resolved Resolved By    None active    Resolved    COVID-19 Rule Out 05/24/20 05/24/20 05/24/20 COVID-19 (Ordered)   05/24/20 Rule-Out Test Resulted          Nurse Assessment:  Last Vital Signs: BP (!) 156/87   Pulse 83   Temp 97.4 °F (36.3 °C) (Oral)   Resp 16   Ht 5' 3\" (1.6 m)   Wt 119 lb 7.8 oz (54.2 kg)   SpO2 90%   BMI 21.17 kg/m²     Last documented pain score (0-10 scale): Pain Level: 6  Last Weight:   Wt Readings from Last 1 Encounters:   05/25/20 119 lb 7.8 oz (54.2 kg)     Mental Status:  oriented, alert and some confusion this admission    IV Access:  - None    Nursing Mobility/ADLs:  Walking   Dependent  Transfer  Dependent  Bathing  Dependent  Dressing  Dependent  Toileting  Dependent  Feeding  Dependent  Med Admin  Dependent  Med Delivery   whole    Wound Care Documentation and Therapy:        Elimination:  Continence:   · Bowel: Yes  · Bladder: No  Urinary Catheter: None   Colostomy/Ileostomy/Ileal Conduit: No       Date of Last BM: 5/28/2020    Intake/Output Summary (Last 24 hours) at 5/29/2020 1224  Last data filed at 5/29/2020 0942  Gross per 24 hour   Intake 1060 ml   Output 100 ml   Net 960 ml     I/O last 3 completed shifts: In: 1100 [P.O.:1100]  Out: 100 [Urine:100]    Safety Concerns:     Sund[place] and At Risk for Falls    Impairments/Disabilities:      does not ambulate    Nutrition Therapy:  Current Nutrition Therapy:   - Oral Diet:  General    Routes of Feeding: Oral  Liquids: No Restrictions  Daily Fluid Restriction: no  Last Modified Barium Swallow with Video (Video Swallowing Test): not done    Treatments at the Time of Hospital Discharge:   Respiratory Treatments: none  Oxygen Therapy:  is on oxygen at 1 L/min per nasal cannula.   Ventilator:    - No ventilator support    Rehab Therapies: Physical Therapy and Occupational Therapy  Weight Bearing Status/Restrictions: No weight

## 2020-05-29 NOTE — PROGRESS NOTES
VSS. Pt now on room air and saturating mid 90s. Pt A/O x4 currently. Pt c/o pain 8/10 in the left hip and took Norco. Pt now sleeping. Pt also has ice in the incision. Pt ordered cheese and crackers from the kitchen but refused the meal once delivered. Rn encouraging pt for more PO intake. Pt voiding adequately per pure wick and brief. Pt able to moves legs to command and currently denies numbness and tingling. Bed alarm on, wheels locked, bed in lowest position, side rails up 2/4, nonskid socks on, call light and bedside table in reach. Will continue to monitor.

## 2020-06-03 ENCOUNTER — CARE COORDINATION (OUTPATIENT)
Dept: CASE MANAGEMENT | Age: 85
End: 2020-06-03

## 2020-06-15 ENCOUNTER — CARE COORDINATION (OUTPATIENT)
Dept: CASE MANAGEMENT | Age: 85
End: 2020-06-15

## 2020-06-15 NOTE — CARE COORDINATION
Spoke with :    Nurse                                            @ :[facility]    Incision status:States since removal of original dressing, free from redness or drainage. Pain control:States pain is well controlled and taking pain meds throughout the day. Therapies involved:Continues with therapy. Tolerating:Well.     Barriers to being discharged home:NA, LTC    Projected discharge date:    Discharge needs:      Estrella ACOSTAN  Care Transition Nurse for ortho bundle  [phone]

## 2020-06-22 ENCOUNTER — CARE COORDINATION (OUTPATIENT)
Dept: CASE MANAGEMENT | Age: 85
End: 2020-06-22

## 2020-06-29 ENCOUNTER — CARE COORDINATION (OUTPATIENT)
Dept: CASE MANAGEMENT | Age: 85
End: 2020-06-29

## 2020-07-06 ENCOUNTER — CARE COORDINATION (OUTPATIENT)
Dept: CASE MANAGEMENT | Age: 85
End: 2020-07-06

## 2020-07-14 ENCOUNTER — CARE COORDINATION (OUTPATIENT)
Dept: CASE MANAGEMENT | Age: 85
End: 2020-07-14

## 2020-07-14 NOTE — CARE COORDINATION
Spoke with :   Rogerio                                            @ :    Incision status:States free from redness or drainage. Pain control:States taking percocet prn. Therapies involved:Continues with therapy.      Tolerating:Well    Barriers to being discharged home:Na, LTC    Projected discharge date:NA, LTC    Discharge needs:LATIA, [facility] Transition Nurse for ortho bundle  [phone]

## 2020-07-21 ENCOUNTER — CARE COORDINATION (OUTPATIENT)
Dept: CASE MANAGEMENT | Age: 85
End: 2020-07-21

## 2020-07-28 ENCOUNTER — CARE COORDINATION (OUTPATIENT)
Dept: CASE MANAGEMENT | Age: 85
End: 2020-07-28

## 2020-08-05 ENCOUNTER — CARE COORDINATION (OUTPATIENT)
Dept: CASE MANAGEMENT | Age: 85
End: 2020-08-05

## 2020-08-05 NOTE — CARE COORDINATION
Spoke with :   Rogerio                                            @ : [address]. Incision status:States incision is healed. Therapies involved:States believes patient is still participating in therapy. Tolerating:Well.     Barriers to being discharged home:NA, LTC    Projected discharge date:Na, LTC    Discharge needs:NA, [address] [facility] Transition Nurse for ortho bundle  [phone]

## 2020-08-12 ENCOUNTER — CARE COORDINATION (OUTPATIENT)
Dept: CASE MANAGEMENT | Age: 85
End: 2020-08-12

## 2020-08-12 NOTE — CARE COORDINATION
Spoke with :   Yolette Mesa                                            @ :Kevin Covarrubias. Incision status:States incision is healed. Pain control:States not complaining of pain. Therapies involved:Continues with therapy. Tolerating:Well.     Barriers to being discharged home:NA, LTC    Projected discharge date:NA, LTC    Discharge needs:NA, [facility] [facility] Transition Nurse for ortho bundle  [phone]

## 2020-08-19 ENCOUNTER — CARE COORDINATION (OUTPATIENT)
Dept: CASE MANAGEMENT | Age: 85
End: 2020-08-19

## 2020-08-19 NOTE — CARE COORDINATION
Spoke with :     Rogerio                                          @ : [facility]    Incision status:States healed. Pain control:No complaints of pain. Therapies involved: No longer using otis lift and continues to progress with therapy. Tolerating:Well. Barriers to being discharged home:Na, LTC    Projected discharge date:NA, LTC    Discharge needs:NA, LTC. Instructed [facility] bundle program is ending.       Yann ACOSTAN  Care Transition Nurse for ortho bundle  [phone]

## 2021-04-29 ENCOUNTER — PROCEDURE VISIT (OUTPATIENT)
Dept: SURGERY | Age: 86
End: 2021-04-29
Payer: MEDICARE

## 2021-04-29 VITALS — DIASTOLIC BLOOD PRESSURE: 74 MMHG | SYSTOLIC BLOOD PRESSURE: 164 MMHG | TEMPERATURE: 96.9 F

## 2021-04-29 DIAGNOSIS — D48.5 NEOPLASM OF UNCERTAIN BEHAVIOR OF SKIN: Primary | ICD-10-CM

## 2021-04-29 DIAGNOSIS — C44.319 BASAL CELL CARCINOMA OF RIGHT CHEEK: ICD-10-CM

## 2021-04-29 DIAGNOSIS — C44.529 SQUAMOUS CELL CARCINOMA OF SKIN OF CHEST: ICD-10-CM

## 2021-04-29 PROCEDURE — 12032 INTMD RPR S/A/T/EXT 2.6-7.5: CPT | Performed by: DERMATOLOGY

## 2021-04-29 PROCEDURE — 12052 INTMD RPR FACE/MM 2.6-5.0 CM: CPT | Performed by: DERMATOLOGY

## 2021-04-29 PROCEDURE — 17311 MOHS 1 STAGE H/N/HF/G: CPT | Performed by: DERMATOLOGY

## 2021-04-29 PROCEDURE — 17312 MOHS ADDL STAGE: CPT | Performed by: DERMATOLOGY

## 2021-04-29 PROCEDURE — 11602 EXC TR-EXT MAL+MARG 1.1-2 CM: CPT | Performed by: DERMATOLOGY

## 2021-04-29 PROCEDURE — 11102 TANGNTL BX SKIN SINGLE LES: CPT | Performed by: DERMATOLOGY

## 2021-04-29 NOTE — PROGRESS NOTES
S:  Pt is here today for Mohs surgery of a biopsy proven NBCC right infraorbital area. At the time of the visit, the patient also c/o new onset lesion on the mid back. Duration:  months. Sx:  Not healing/red. Previous tx:  A topical cream prescribed, uncertain of name but doesn't feel it is working. O:  On the mid back, there is a 20mm pink superficially eroded plaquee. AP:  1. Neoplasm of uncertain behavior:  R/O BCC  Location: mid back  - Discussed possible diagnosis. Patient agreeable to biopsy. Verbal and written consent obtained after risks (infection, bleeding, scar), benefits and alternatives explained. - The area to be biopsied was marked with a surgical marking pen and a verbal time-out was performed. - Local anesthesia was achieved with 1% lidocaine with epinephrine/sodium bicarbonate. - The area was cleansed with alcohol and a tangential shave biopsy was performed using a derma-blade. Hemostasis was achieved with aluminum chloride. A small amount of petroleum jelly was applied to the wound and a band-aid applied. - Specimen placed in a correctly identified specimen container. - 1 specimen(s) sent to pathology. There were no immediate complications and the patient left the office in good condition.  -  Patient educated re: risk of bleeding, infection, scar and wound care instructions reviewed. The patient will be informed of biopsy results by phone or letter as soon as available.

## 2021-04-29 NOTE — PROGRESS NOTES
PRE-PROCEDURE SCREENING    Pacemaker/ICD: No  Difficulty with numbing in the past: No  Local Anesthesia Reaction/passing out: No  Latex or adhesive allergy:  No  Bleeding/Clotting Disorders: No  Anticoagulant Therapy: Yes-Eliquis   Joint prosthesis: Yes-Bilateral knee replacement  Artificial Heart Valve: No  Stroke or Seizures: No  Organ Transplant or Lymphoma: No  Immunosuppression: No  Respiratory Problems: No

## 2021-04-29 NOTE — PROGRESS NOTES
MOHS PROCEDURE NOTE    PHYSICIAN:  Dajuan Sainz. Kimo Barron MD    ASSISTANT: Sumaya Carmona, RN, Carlos Alberto Gallagher, RN     REFERRING PROVIDER:  Kari Soto MD    PREOPERATIVE DIAGNOSIS: Nodular Basal Cell Carcinoma     SPECIFIC MOHS INDICATIONS:  location    AUC SCORIN/9    POSTOPERATIVE DIAGNOSIS: SAME    LOCATION: Right malar infraorbital area    OPERATIVE PROCEDURE:  MOHS MICROGRAPHIC SURGERY    RECONSTRUCTION OF DEFECT: Intermediate layered closure    PREOPERATIVE SIZE: 11x9 MM    DEFECT SIZE: 20x12 MM    LENGTH OF REPAIRED WOUND/SIZE OF FLAP/SIZE OF GRAFT:  40 MM    ANESTHESIA:  10mL 1% lidocaine with epinephrine 1:100,000 buffered. EBL:  MINIMAL    DURATION OF PROCEDURE:  2 HOURS    POSTOPERATIVE OBSERVATION: 1 HOUR    SPECIMENS:  SEE MOHS MAP    COMPLICATIONS:  NONE    DESCRIPTION OF PROCEDURE:  The patient was given a mirror, as appropriate, and the biopsy site was identified, marked with a surgical marking pen, and verified by the patient. Options for treatment were discussed and the patient was informed that Mohs surgery was the selected treatment based on its lower recurrence rate, given the features listed above, as compared to other treatment modalities such as excision, radiation, or curettage, and agreed with this treatment plan. Risks and benefits including bruising, swelling, bleeding, infection, nerve injury, recurrence, and scarring were discussed with the patient prior to the procedure and a written consent detailing these and other risks was reviewed with the patient and signed. There was a time out for person and procedure verification. The surgical site was prepped with an antiseptic solution. Application of an antiseptic solution was repeated before each surgical stage. Stage I:  The clinically-apparent tumor was carefully defined and debulked, determining the edge of the surgical excision.     A thin layer of tumor-laden tissue was excised with a narrow margin of normal-appearing skin, using the technique of Mohs. A map was prepared to correspond to the area of skin from which it was excised. Hemostasis was achieved using electrosurgery. The wound was bandaged. The tissue was prepared for the cryostat and sectioned. 1 section(s) prepared. Each section was coded, cut, and stained for microscopic examination. The entire base and margins of the excised piece of tissue were examined by the surgeon. The tissue was examined to the level of subcut fat. 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 Stage II:  A thin layer of tissue was removed at the histologically-identified sites of remaining tumor. The entire procedure as described in stage I was repeated to process the tissue according to Mohs technique. 1 section(s) prepared for stage II. No tumor was identified at the peripheral margins of stage II of microscopically controlled surgery. DEFECT MANAGEMENT:    REPAIR DESCRIPTION:  Various closure modalities were discussed with the patient, and it was decided that an intermediate layered repair would best preserve normal anatomic and functional relationships. Additional risk of wound dehiscence was discussed. The area was anesthetized with 1% lidocaine with epinephrine 1:100,000 buffered, was given a sterile prep using Chlorhexidine gluconate 4% solution and draped in the usual sterile fashion. Recreation and enlargement of the wound was performed by excising cones of tissue via the triangulation technique. The final incision lines were placed with respect for the patient's natural skin tension lines in a linear configuration to avoid functional and aesthetic distortion of adjacent free margins. Following minimal undermining, meticulous hemostasis was obtained with spot monopolar electrocoagulation.   Subcutaneous dead space and dermis were closed

## 2021-04-29 NOTE — PROGRESS NOTES
EXCISION OPERATIVE PROCEDURE NOTE    SURGEON: Merlyn Salinas. Alyssa Arellano MD    ASSISTANT:  Neris Hernández, JAYSON, Brayan Connelly, RN    REFERRING PROVIDER:  Shabana Wiley MD    PREOPERATIVE DIAGNOSIS: Squamous cell carcinoma, Keratoacanthoma type    POSTOPERATIVE DIAGNOSIS: SAME. OPERATIVE PROCEDURE: EXCISION    RECONSTRUCTION OF DEFECT: Intermediate layered closure    LOCATION: Left upper chest     SIZE OF LESION: 8x6 MM     SIZE OF LESION PLUS CIRCUMFERENTIAL MARGIN: 18 x 16 MM     FINAL REPAIR LENGTH:  65 MM    ANESTHESIA:9 CC XYLOCAINE 1% WITH EPINEPHRINE 1:100,000, BUFFERED. DURATION OF PROCEDURE: 30 MINUTES     POSTOPERATIVE OBSERVATION: 30 MINUTES     EBL: MINIMAL. SPECIMENS: 1    COMPLICATIONS: NONE     DESCRIPTION OF PROCEDURE: The patient was given a mirror and the biopsy site was identified, marked with surgical marking pen, and verified with the patient. Written consent was obtained. There was a time out for person and procedure verification. The operative site was cleansed with Chlorhexidine gluconate 4% solution, then cleaned off, dried, and draped sterilely. The lesion was excised in a fusiform design down to subcutaneous fat with 5 mm margins. Hemostasis was achieved with electrosurgery. DEFECT MANAGEMENT:  Various closure modalities were discussed with the patient, and it was decided that an Intermediate layered closure would best preserve normal anatomic and functional relationships. Additional risk of wound dehiscence was discussed. The final incision lines were placed with respect for the patient's natural skin tension lines in a linear configuration to avoid functional and aesthetic distortion of adjacent free margins. Following minimal undermining, meticulous hemostasis was obtained with spot monopolar electrocoagulation.   Subcutaneous dead space and dermis were closed using 3-0 Vicryl buried subcutaneous interrupted suture and the epidermis was approximated with liquid skin adhesive. The wound was cleaned with normal saline solution, dried off, Aquaphor ointment was applied, and the wound was covered. A dressing was applied for stabilization and light pressure. The patient was given detailed oral and written instructions on postoperative care. There were no complications. The patient left the Unit in good medical condition and was scheduled to return for suture removal/wound check as needed.

## 2021-04-29 NOTE — PATIENT INSTRUCTIONS
[facility] Mohs Surgery Office Hours:    Monday-Thursday  7:30 AM-4:30 PM    Friday  9:00 AM-1:00 PM    POST-OPERATIVE CARE FOR DISSOLVING STICHES  Bandage change after 48 hours (cheek)    During your procedure today, dissolving sutures, or stiches, were used to close the wound. You do not have to have stiches removed. They will dissolve (melt away) on their own. Please follow these instructions to help you recover from your procedure and help your wound heal.    CARING FOR YOUR SURGICAL SITE  The bandage should remain on and completely dry for 48 hours. Do NOT get the bandage wet. 1. After the first 48 hours, gently remove the remaining part of the bandage. It can be helpful to moisten the bandage edges in the shower. Steri strips may still be on the wound. It is ok, they will fall off slowly with the daily bandage changes. 2. Gently clean AROUND the wound daily with mild soap and water. Please avoid the area from getting wet. The more moisture is on the sutures the quicker they will dissolve. 3. Dry (pat) the area with a clean Q-tip or gauze. Try to clean off any debris or crust from the area. 4. Apply a layer of Vaseline/ Aquaphor (or Bacitracin if your doctor recommends) to the wound area only. 5. Cut a piece of Telfa (or any non-stick dressing) to fit just over the wound and secure it with paper tape. If the wound is small you may use a Band- Aid. Keep area covered for a total of 1 week(s). If the dressing comes off or if you have questions, or concerns about the dressing, please call the office for instructions! POST OPERATIVE INSTRUCTIONS    1. Activity: Do not lift anything heavier than a gallon of milk for 1 week. Also, avoid strenuous activity such as running, power walking or contact sports. 2. Eating and drinking: Do not drink alcohol for 48 hours after your procedure. Alcohol increases the chances of bleeding.   3. Medicines   -If you have discomfort, take Acetaminophen (Tylenol or Extra Strength Tylenol). Follow the instructions and warning on the bottle. -If your doctor has prescribed you an Aspirin daily, please keep taking it. Do not take extra Aspirin or medicines containing Aspirin (such as [place] and Excedrin) for 48 hours after your procedure. Bleeding: If bleeding occurs, DO NOT remove the bandage. Put firm pressure on the area with gauze for 20 minutes without peeking. If the bleeding continues, apply pressure for another 20 minutes. If the bleeding does not stop after you apply pressure, call us right away. If you can not call, go to the nearest emergency room or urgent care facility. What to expect:  You may have these symptoms. They are normal and should get better with time:  1. Swelling. Swelling usually increases for the first 48 hours after your procedure and then begins to improve. Some soreness and redness around your wound. If we worked close to your eyes (forehead, nose, temple, or upper cheeks) your eyes may become swollen and/ or black and blue. 2. Bruising, which could last 1 week or more. 3. Pink and bumpy appearance to the scar. This may happen a few weeks after your procedure. After 4 weeks, you may gently massage the area each day with facial moisturizer or petroleum jelly (Vaseline or Aquaphor). This will help to smooth the skin and improve the appearance of the scar. The color of your scar will fade over time, but it may be pink for several months after the procedure. The scar may take 6 months to 1 year to reach its final color and appearance. 4. \"Spitting\" suture. Occasionally, an inside suture (stitch) does not completely dissolve. When this happens, (generally 4-8 weeks after surgery), it causes a bump or \"pimple\" to form on the scar. This is easily removed and is not at all serious. It does not mean the skin cancer has returned. Contact us if it happens, but do not be alarmed. Vitamin E oil is NOT necessary.  A good moisturizer is just as effective. Sunscreen IS necessary. Use at least and SPF 30 sunscreen daily- even in winter    Call us at [phone] right away if you have any of the following symptoms:  -Bleeding that you can not stop (see highlighted area above). -Pain that lasts longer than 48 hours.  -Your wound becomes more painful, red or hot. -Bruising and swelling that does not begin to improve within the 48 hours or gets worse suddenly. POST-OPERATIVE CARE FOR LIQUID SKIN ADHESIVE             Bandage change after 24 hours    During your procedure today, a liquid skin adhesive was used to close the wound. You do not have to have stiches removed. If has a clear to light purple shiny surface. You do not have to have this removed. It will dissolve (melt away) in about 1-2 weeks. Please follow these instructions to help you recover from your procedure and help your wound heal.    CARING FOR YOUR SURGICAL SITE  The bandage should remain on and completely dry for 24 hours. Do NOT get the bandage wet. 6. After the first 24 hours, gently remove the remaining part of the bandage. It can be helpful to moisten the bandage edges in the shower. 7. Be gentle around the area of the wound. Do not scrub, rub or pick at the skin glue. It will gradually dissolve in 1-2 weeks. 8. Do not shave directly over the wound for one week. You can shave around the area. 9. After one week you can start cleaning the area gently and resume all normal activity. No further restrictions. 10. Use Sunscreen with SPF of at least 30 on the area around the wound. If the dressing comes off or if you have questions, or concerns about the dressing, please call the office for instructions! POST OPERATIVE INSTRUCTIONS    4. Activity: it is recommended to avoid strenuous activity such as lifting, pushing, pulling, running, power walking or contact sports for at least 2-7 days or as recommended by your provider.   5. Eating and drinking: Do not drink alcohol for 48 hours after your procedure. Alcohol increases the chances of bleeding. 6. Medicines   -If you have discomfort, take Acetaminophen (Tylenol or Extra Strength Tylenol). Follow the instructions and warning on the bottle. Bleeding: If bleeding occurs, Put firm pressure on the area with gauze for 20 minutes without peeking. If the bleeding continues you may remove the bandage to locate where the bleeding is coming from and apply pressure for another 20 minutes with gauze and an ice pack. If the bleeding does not stop after you apply pressure and ice pack, call us right away. If you call after hours a call service will transfer you to the physician. If you cannot call or get through to the doctor, go to the nearest emergency room or urgent care facility. What to expect:  You may have these symptoms. They are normal and should get better with time:  5. Swelling. Swelling usually increases for the first 48 hours after your procedure and then begins to improve. Some soreness and redness around your wound. If we worked close to your eyes (forehead, nose, temple, or upper cheeks) your eyes may become swollen and/ or black and blue. 6. Bruising, which could last 1 week or more. 7. Pink and bumpy appearance to the scar. This may happen a few weeks after your procedure. After 4 weeks, you may gently massage the area each day with facial moisturizer or petroleum jelly (Vaseline or Aquaphor). This will help to smooth the skin and improve the appearance of the scar. The color of your scar will fade over time but may be pink for several months after the procedure. The scar may take 6 months to 1 year to reach its final color and appearance. 8. \"Spitting\" suture. Occasionally, an inside suture (stitch) does not completely dissolve. When this happens, (generally 4-8 weeks after surgery), it causes a bump or \"pimple\" to form on the scar. This is easily removed and is not at all serious.  It does not mean the skin cancer has

## 2021-04-30 ENCOUNTER — TELEPHONE (OUTPATIENT)
Dept: SURGERY | Age: 86
End: 2021-04-30

## 2021-04-30 NOTE — TELEPHONE ENCOUNTER
The patient was in the office on 4/29/21 for Mohs located on the left upper chest and right malar infraorbital area with ILC repair. The patient tolerated the procedure well and left the office in good condition. Pain level on post-operative day 1:  none today. Any bleeding episode that required pressure to be held, bandage change or a call to the office or MD?  no     Any other issues?:  no    A post-operative telephone call was placed at [phone] in order to check on the patient's recovery process. The patient reported doing well and had no complaints other than those listed above, if any. All of the patient's questions were answered.

## 2021-05-03 ENCOUNTER — TELEPHONE (OUTPATIENT)
Dept: SURGERY | Age: 86
End: 2021-05-03

## 2021-05-04 NOTE — TELEPHONE ENCOUNTER
Pt calling back to see what the message was regarding from yesterday. I explained that our nursing staff was calling to discuss biopsy results and due to them being with surgery patients. Please call to discuss results.

## 2021-05-04 NOTE — TELEPHONE ENCOUNTER
I spoke with the patient today by telephone. I reviewed results of the excision with the patient. Date of excision: 4/29/2021  Site of excision: left upper chest   Result: SCC, KA type completely excised    Plan: no further treatment      Date of biopsy: 4/29/2021  Site of biopsy: mid back  Result: sccis    Plan:  Schedule for [facility]      The patient expressed understanding of the plan.

## 2021-05-05 ENCOUNTER — TELEPHONE (OUTPATIENT)
Dept: SURGERY | Age: 86
End: 2021-05-05

## 2021-05-05 NOTE — TELEPHONE ENCOUNTER
Pt returned call and stated that she'd like to use prescription cream on her back instead of having ED&C. Please call her to let her know the script has been sent over to [address] on [address] [facility]. Leave her a message if you get her answering machine.

## 2021-05-06 RX ORDER — FLUOROURACIL 50 MG/G
CREAM TOPICAL
Qty: 40 G | Refills: 0 | Status: SHIPPED | OUTPATIENT
Start: 2021-05-06

## 2021-05-06 NOTE — TELEPHONE ENCOUNTER
Pt called back to confirm script was sent to Pharmacy and I confirmed with her that it had been sent electronically.

## 2021-05-26 ENCOUNTER — TELEPHONE (OUTPATIENT)
Dept: SURGERY | Age: 86
End: 2021-05-26

## 2021-05-26 NOTE — TELEPHONE ENCOUNTER
Pt said she had a phone call from our office but she wasn't sure what it was about. I inquired if she checked her messages to see who left her a notification but she couldn't tell me. Please call pt if you need to reach her for anything related to post Mohs.

## 2021-05-26 NOTE — TELEPHONE ENCOUNTER
Call returned to PT - N/A L/M that we received her msg that she thought someone had called her from this office. I advised that I checked w/everyone and no one has called her. Advised to call if she has any questions/conconcerns.

## 2021-08-17 ENCOUNTER — TELEPHONE (OUTPATIENT)
Dept: SURGERY | Age: 86
End: 2021-08-17

## 2021-08-17 NOTE — TELEPHONE ENCOUNTER
Patient's nurse states that patient used cream on her back for a month or two then stopped. Stated that recently a small scabbed area and some redness has happened where the cream was applied.  Unsure if they should apply more cream, schedule appointment or normal.

## 2021-08-17 NOTE — TELEPHONE ENCOUNTER
Lionel Perez from [address] [place] [address] stated in her voice message that the patient is returning to Dr. Ricki Armijo to have area checked this week.

## 2021-08-17 NOTE — TELEPHONE ENCOUNTER
Keep aquaphor and covered until it can be seen and evaluated. She can [place] back with her referring provider Lasandra Cushing or I can see her back mid September or early October.

## 2021-08-17 NOTE — TELEPHONE ENCOUNTER
Spoke with patient's nurse, Carmita Ying and informed her that Dr. Shahab Jarrell advised not to use the efudex cream but instead to use aquaphor or vaseline. Asked her if patient had an appointment with Dr. Olga Lidia Sibley her general dermatologist anytime soon and she stated she was not aware. Advised her that Dr. Shahab Jarrell could assess site if patient wanted but would not be able to schedule until second week of September. Carmita Ying stated she would speak with patient and call office if she wanted to be seen.

## 2021-08-17 NOTE — TELEPHONE ENCOUNTER
The patient lives at the [facility] and her nurse Kwabena Sykes says she was applying flouracil application to her mid back and it was discontinued by the nursing facility, w/o a reason. The nurse was checking the area and she noticed the area to be dark red pink area around perimeter and the center has a dark head to it, similar to a black-head. The area is tender. The nurse is ending a photo to to Mohs email address.

## 2021-11-30 ENCOUNTER — TELEPHONE (OUTPATIENT)
Dept: SURGERY | Age: 86
End: 2021-11-30

## 2023-10-03 NOTE — CARE COORDINATION
----- Message from Merlene Kate sent at 10/3/2023  9:59 AM CDT -----  Regarding: call back  .Type:  Needs Medical Advice    Who Called: pt  Symptoms (please be specific):    How long has patient had these symptoms:    Pharmacy name and phone #:    Would the patient rather a call back or a response via [facility]? Call back  Best Call Back Number: [phone]  Additional Information: pt is requesting a call back pt had questions about last visit         Spoke with :    Rogerio                                           @ :[facility]    Incision status:States free from redness or drainage. Pain control:States pain is well controlled. Therapies involved:Continues with therapy    Tolerating:Well    Barriers to being discharged home:NA, LTC. Projected discharge date:NA, LT.     Discharge needs:[facility], [address] [place] [facility]  Care Transition Nurse for ortho bundle  [phone]

## (undated) DEVICE — YANKAUER,OPEN TIP,W/O VENT,STERILE: Brand: MEDLINE INDUSTRIES, INC.

## (undated) DEVICE — 3M™ COBAN™ NL STERILE NON-LATEX SELF-ADHERENT WRAP, 2086S, 6 IN X 5 YD (15 CM X 4,5 M), 12 ROLLS/CASE: Brand: 3M™ COBAN™

## (undated) DEVICE — PEEL-AWAY HOOD: Brand: FLYTE, SURGICOOL

## (undated) DEVICE — Z CONVERTED USE 2271043 CONTAINER SPEC COLL 4OZ SCR ON LID PEEL PCH

## (undated) DEVICE — SUTURE STRATAFIX SPRL SZ 1 L14IN ABSRB VLT L48CM CTX 1/2 SXPD2B405

## (undated) DEVICE — ORTHO PRE OP PACK: Brand: MEDLINE INDUSTRIES, INC.

## (undated) DEVICE — APPLICATOR PREP 26ML 0.7% IOD POVACRYLEX 74% ISO ALC ST

## (undated) DEVICE — MARKER SURG SKIN UTIL BLK REG TIP NONSMEARING W/ 6IN RUL

## (undated) DEVICE — SURE SET-DOUBLE BASIN-LF: Brand: MEDLINE INDUSTRIES, INC.

## (undated) DEVICE — COAXIAL HIGH FLOW TIP WITH SOFT SHIELD

## (undated) DEVICE — COUNTER NDL 40 COUNT HLD 70 NUM FOAM BLK SGL MAG W BLDE REMV

## (undated) DEVICE — HANDPIECE SUCTION TUBING INTERPULSE 10FT

## (undated) DEVICE — DECANTER BAG 9": Brand: MEDLINE INDUSTRIES, INC.

## (undated) DEVICE — PLATE ES AD W 9FT CRD 2

## (undated) DEVICE — STERILE POLYISOPRENE POWDER-FREE SURGICAL GLOVES WITH EMOLLIENT COATING: Brand: PROTEXIS

## (undated) DEVICE — SUTURE VCRL SZ 0 L18IN ABSRB UD L36MM CT-1 1/2 CIR J840D

## (undated) DEVICE — GARMENT,MEDLINE,DVT,INT,CALF,MED, GEN2: Brand: MEDLINE

## (undated) DEVICE — SUTURE VCRL SZ 1 L18IN ABSRB UD L36MM CT-1 1/2 CIR J841D

## (undated) DEVICE — PROTECTOR ULN NRV PUR FOAM HK LOOP STRP ANATOMICALLY

## (undated) DEVICE — GLOVE 6 LTX ST BIOGEL M PF BEAD CUFF

## (undated) DEVICE — PILLOW ABD W STRP CNTOUR FOAM M

## (undated) DEVICE — JEWISH HOSPITAL TURNOVER KIT: Brand: MEDLINE INDUSTRIES, INC.

## (undated) DEVICE — UNDERGLOVE SURG SZ 8.5 FNGR THK0.21MIL GRN LTX BEAD CUF

## (undated) DEVICE — SYRINGE MED 30ML STD CLR PLAS LUERLOCK TIP N CTRL DISP

## (undated) DEVICE — PACK PROCEDURE SURG TOT HIP

## (undated) DEVICE — SYRINGE IRRIG 60ML SFT PLIABLE BLB EZ TO GRP 1 HND USE W/

## (undated) DEVICE — SUTURE ETHBND EXCEL SZ 5 L30IN NONABSORBABLE GRN L40MM V-37 MB66G

## (undated) DEVICE — COVER LT HNDL BLU PLAS

## (undated) DEVICE — Z INACTIVE NO SUPPLIER SOLUTIONIRRIG 3000ML 0.9% SOD CHL FLX CONT [79720808] [HOSPIRA WORLDWIDE INC]

## (undated) DEVICE — TRAY, SPINAL PENCIL POINT 25G: Brand: MEDLINE INDUSTRIES, INC.

## (undated) DEVICE — STAPLER SKIN H3.9MM WIRE DIA0.58MM CRWN 6.9MM 35 STPL ROT

## (undated) DEVICE — BLANKET WRM W29.9XL79.1IN UP BODY FORC AIR MISTRAL-AIR

## (undated) DEVICE — PREVENA INCISION MANAGEMENT SYSTEM- PEEL & PLACE DRESSING: Brand: PREVENA™ PEEL & PLACE™

## (undated) DEVICE — BIPOLAR SEALER 23-112-1 AQM 6.0: Brand: AQUAMANTYS ®

## (undated) DEVICE — NEEDLE SPNL L3.5IN PNK HUB S STL REG WALL FIT STYL W/ QNCKE

## (undated) DEVICE — 3M™ STERI-DRAPE™ U-DRAPE 1015: Brand: STERI-DRAPE™

## (undated) DEVICE — BLADE SAW RECIP HVY DUTY LNG 27796327] STRYKER CORP]

## (undated) DEVICE — SUTURE MCRYL SZ 4-0 L27IN ABSRB UD L19MM PS-2 1/2 CIR PRIM Y426H

## (undated) DEVICE — STERILE PVP: Brand: MEDLINE INDUSTRIES, INC.

## (undated) DEVICE — 3M™ STERI-DRAPE™ INSTRUMENT POUCH 1018: Brand: STERI-DRAPE™

## (undated) DEVICE — STERILE LATEX POWDER-FREE SURGICAL GLOVES WITH HYDROGEL COATING, SMOOTH FINISH, STRAIGHT FINGER: Brand: PROTEXIS

## (undated) DEVICE — SUTURE VCRL SZ 2-0 L18IN ABSRB UD CT-1 L36MM 1/2 CIR J839D

## (undated) DEVICE — 1010 S-DRAPE TOWEL DRAPE 10/BX: Brand: STERI-DRAPE™

## (undated) DEVICE — NEEDLE SUT SZ 3 MAYO 1 2 CIR TAPR PNT DISPOSABLE